# Patient Record
Sex: MALE | Race: WHITE | Employment: OTHER | URBAN - METROPOLITAN AREA
[De-identification: names, ages, dates, MRNs, and addresses within clinical notes are randomized per-mention and may not be internally consistent; named-entity substitution may affect disease eponyms.]

---

## 2018-01-03 PROBLEM — Z45.09 ENCOUNTER FOR ELECTRONIC ANALYSIS OF REVEAL EVENT RECORDER: Status: ACTIVE | Noted: 2018-01-03

## 2018-01-03 PROBLEM — I48.0 PAROXYSMAL ATRIAL FIBRILLATION (HCC): Status: ACTIVE | Noted: 2018-01-03

## 2018-01-03 RX ORDER — ERGOCALCIFEROL (VITAMIN D2) 1250 MCG
50000 CAPSULE ORAL DAILY
COMMUNITY
Start: 2017-09-05

## 2018-01-03 RX ORDER — LISINOPRIL 5 MG/1
5 TABLET ORAL 2 TIMES DAILY
COMMUNITY
Start: 2017-08-16 | End: 2019-10-30 | Stop reason: SDUPTHER

## 2018-01-03 RX ORDER — OXYBUTYNIN CHLORIDE 5 MG/1
5 TABLET ORAL 3 TIMES DAILY
COMMUNITY
End: 2019-10-30

## 2018-01-03 RX ORDER — TROSPIUM CHLORIDE 20 MG/1
20 TABLET, FILM COATED ORAL DAILY
COMMUNITY
Start: 2017-10-17 | End: 2020-07-29

## 2018-01-03 RX ORDER — MULTIVIT-MINS NO.63/IRON/FOLIC 27 MG-1 MG
1 TABLET ORAL DAILY
COMMUNITY
End: 2020-07-29

## 2018-01-03 RX ORDER — GABAPENTIN 100 MG/1
100 CAPSULE ORAL 3 TIMES DAILY
Status: ON HOLD | COMMUNITY
Start: 2017-12-11 | End: 2019-03-01 | Stop reason: ALTCHOICE

## 2018-01-03 RX ORDER — WARFARIN SODIUM 5 MG/1
TABLET ORAL
COMMUNITY
Start: 2017-08-25 | End: 2019-10-30 | Stop reason: SDUPTHER

## 2018-01-04 ENCOUNTER — OFFICE VISIT (OUTPATIENT)
Dept: CARDIOLOGY CLINIC | Age: 68
End: 2018-01-04

## 2018-01-04 ENCOUNTER — PROCEDURE VISIT (OUTPATIENT)
Dept: CARDIOLOGY CLINIC | Age: 68
End: 2018-01-04

## 2018-01-04 VITALS
BODY MASS INDEX: 31.15 KG/M2 | WEIGHT: 230 LBS | HEIGHT: 72 IN | DIASTOLIC BLOOD PRESSURE: 70 MMHG | HEART RATE: 60 BPM | SYSTOLIC BLOOD PRESSURE: 116 MMHG | OXYGEN SATURATION: 97 %

## 2018-01-04 DIAGNOSIS — I42.8 OTHER CARDIOMYOPATHY (HCC): ICD-10-CM

## 2018-01-04 DIAGNOSIS — Z45.09 ENCOUNTER FOR ELECTRONIC ANALYSIS OF REVEAL EVENT RECORDER: Primary | ICD-10-CM

## 2018-01-04 DIAGNOSIS — I48.0 PAROXYSMAL ATRIAL FIBRILLATION (HCC): ICD-10-CM

## 2018-01-04 DIAGNOSIS — Z95.818 STATUS POST PLACEMENT OF IMPLANTABLE LOOP RECORDER: ICD-10-CM

## 2018-01-04 DIAGNOSIS — R00.1 BRADYCARDIA: ICD-10-CM

## 2018-01-04 DIAGNOSIS — R00.2 PALPITATIONS: ICD-10-CM

## 2018-01-04 DIAGNOSIS — I48.0 PAROXYSMAL ATRIAL FIBRILLATION (HCC): Primary | ICD-10-CM

## 2018-01-04 DIAGNOSIS — R53.83 FATIGUE, UNSPECIFIED TYPE: ICD-10-CM

## 2018-01-04 PROCEDURE — 1036F TOBACCO NON-USER: CPT | Performed by: INTERNAL MEDICINE

## 2018-01-04 PROCEDURE — G8417 CALC BMI ABV UP PARAM F/U: HCPCS | Performed by: INTERNAL MEDICINE

## 2018-01-04 PROCEDURE — 4040F PNEUMOC VAC/ADMIN/RCVD: CPT | Performed by: INTERNAL MEDICINE

## 2018-01-04 PROCEDURE — 3017F COLORECTAL CA SCREEN DOC REV: CPT | Performed by: INTERNAL MEDICINE

## 2018-01-04 PROCEDURE — 93291 INTERROG DEV EVAL SCRMS IP: CPT | Performed by: INTERNAL MEDICINE

## 2018-01-04 PROCEDURE — 99214 OFFICE O/P EST MOD 30 MIN: CPT | Performed by: INTERNAL MEDICINE

## 2018-01-04 PROCEDURE — 1123F ACP DISCUSS/DSCN MKR DOCD: CPT | Performed by: INTERNAL MEDICINE

## 2018-01-04 PROCEDURE — G8484 FLU IMMUNIZE NO ADMIN: HCPCS | Performed by: INTERNAL MEDICINE

## 2018-01-04 PROCEDURE — G8427 DOCREV CUR MEDS BY ELIG CLIN: HCPCS | Performed by: INTERNAL MEDICINE

## 2018-01-04 NOTE — PATIENT INSTRUCTIONS
Patient Education        Atrial Fibrillation: Care Instructions  Your Care Instructions    Atrial fibrillation is an irregular and often fast heartbeat. Treating this condition is important for several reasons. It can cause blood clots, which can travel from your heart to your brain and cause a stroke. If you have a fast heartbeat, you may feel lightheaded, dizzy, and weak. An irregular heartbeat can also increase your risk for heart failure. Atrial fibrillation is often the result of another heart condition, such as high blood pressure or coronary artery disease. Making changes to improve your heart condition will help you stay healthy and active. Follow-up care is a key part of your treatment and safety. Be sure to make and go to all appointments, and call your doctor if you are having problems. It's also a good idea to know your test results and keep a list of the medicines you take. How can you care for yourself at home? Medicines  ? · Take your medicines exactly as prescribed. Call your doctor if you think you are having a problem with your medicine. You will get more details on the specific medicines your doctor prescribes. ? · If your doctor has given you a blood thinner to prevent a stroke, be sure you get instructions about how to take your medicine safely. Blood thinners can cause serious bleeding problems. ? · Do not take any vitamins, over-the-counter drugs, or herbal products without talking to your doctor first.   ? Lifestyle changes  ? · Do not smoke. Smoking can increase your chance of a stroke and heart attack. If you need help quitting, talk to your doctor about stop-smoking programs and medicines. These can increase your chances of quitting for good. ? · Eat a heart-healthy diet. ? · Stay at a healthy weight. Lose weight if you need to.   ? · Limit alcohol to 2 drinks a day for men and 1 drink a day for women. Too much alcohol can cause health problems. ? · Avoid colds and flu.  Get a

## 2018-01-04 NOTE — PROGRESS NOTES
treatment for cardiomyopathy. It was thought to be due to atrial fibrillation and rapid ventricular response although primary cardiomyopathy could not be ruled out. He is on good management including ACE-I and BB. His echo in March shows normal LVEF. He underwent a LHC by Dr. Michelet Muller that shows normal coronary arteries and LVEF by LV Gram estimates EF at 45-50%. He is no longer on antiarrhythmis (sotalol). He is on coumadin for stroke prevention. While he is active - he walks several miles at a time, and was able to hike while in [place] without symptoms, he has not felt like he had the energy to return to running - which is what he would really like to do. He denies chest pain, shortness of breath, edema or orthopnea. He complains of fatigue. Past Medical History:   Diagnosis Date    [facility]     [facility]     CHF (congestive heart failure) (HCC)     Idiopathic transverse myelitis (HCC)         Past Surgical History:   Procedure Laterality Date    COLONOSCOPY      HERNIA REPAIR      UPPER GASTROINTESTINAL ENDOSCOPY      VASECTOMY         Allergies:  No Known Allergies    Social History:   reports that he has never smoked. He has never used smokeless tobacco. He reports that he drinks alcohol. He reports that he does not use drugs. Family History:  family history includes Prostate Cancer in his father. Reviewed. Denies family history of sudden cardiac death, arrhythmia, premature CAD    Review of System:  All other systems reviewed and are negative except for that noted above.  Pertinent negatives are:   General: negative for fever, chills   Ophthalmic ROS: negative for - eye pain or loss of vision  ENT ROS: negative for - headaches, sore throat   Respiratory: negative for - cough, sputum  Cardiovascular: Reviewed in HPI  Gastrointestinal: negative for - abdominal pain, diarrhea, N/V  Hematology: negative for - bleeding, blood clots, bruising or jaundice  Genito-Urinary:  negative for - Dysuria or incontinence  Musculoskeletal: negative for - Joint swelling, muscle pain  Neurological: negative for - confusion, dizziness, headaches   Psychiatric: No anxiety, no depression. Dermatological: negative for - rash    Physical Examination:  Vitals:    18 1523   BP: 116/70   Pulse: 60   SpO2: 97%      Wt Readings from Last 3 Encounters:   18 230 lb (104.3 kg)   14 224 lb (101.6 kg)       · Constitutional: Oriented. No distress. · Head: Normocephalic and atraumatic. · Mouth/Throat: Oropharynx is clear and moist.   · Eyes: Conjunctivae normal. EOM are normal.   · Neck: Neck supple. No rigidity. No JVD present. · Cardiovascular: Normal rate, regular rhythm, S1&S2. · Pulmonary/Chest: Bilateral respiratory sounds. No wheezes, No rhonchi. · Abdominal: Soft. Bowel sounds present. No distension, No tenderness. · Musculoskeletal: No tenderness. No edema    · Lymphadenopathy: Has no cervical adenopathy. · Neurological: Alert and oriented. Cranial nerve appears intact, No Gross deficit   · Skin: Skin is warm and dry. No rash noted. · Psychiatric: Has a normal behavior     Labs, diagnostic and imaging results reviewed. INR 2.2 (2017)  Cr 0.92 (2017)   (2017)  ALT 28 (2017)  AST 30 (2017)  Reviewed. EC2018   SB 58bpm Qtc 415    ECHO 2017  - Left ventricle: The cavity size was normal. Wall thickness was      normal. Systolic function was normal. The estimated ejection      fraction was in the range of 55% to 60%. Wall motion was normal;      there were no regional wall motion abnormalities. The study is not      technically sufficient to allow evaluation of LV diastolic      function.    - Mitral valve: Mild regurgitation.    - Right ventricle: Systolic function was normal by objective      interpretation.    - Pulmonic valve: Peak gradient (S): 4mm Hg.      Echo 13  Study Conclusions    -

## 2018-02-05 PROCEDURE — 93299 PR REM INTERROG ICPMS/SCRMS <30 D TECH REVIEW: CPT | Performed by: INTERNAL MEDICINE

## 2018-02-05 PROCEDURE — 93298 REM INTERROG DEV EVAL SCRMS: CPT | Performed by: INTERNAL MEDICINE

## 2018-02-06 ENCOUNTER — NURSE ONLY (OUTPATIENT)
Dept: CARDIOLOGY CLINIC | Age: 68
End: 2018-02-06

## 2018-02-06 DIAGNOSIS — I48.0 PAROXYSMAL ATRIAL FIBRILLATION (HCC): ICD-10-CM

## 2018-02-06 DIAGNOSIS — Z45.09 ENCOUNTER FOR ELECTRONIC ANALYSIS OF REVEAL EVENT RECORDER: ICD-10-CM

## 2018-02-08 NOTE — PROGRESS NOTES
Carelink/loop recorder transmission implanted for atrial fib management. Atrial fib noted. Pt is on coumadin.

## 2018-05-13 PROCEDURE — 93298 REM INTERROG DEV EVAL SCRMS: CPT | Performed by: INTERNAL MEDICINE

## 2018-05-13 PROCEDURE — 93299 PR REM INTERROG ICPMS/SCRMS <30 D TECH REVIEW: CPT | Performed by: INTERNAL MEDICINE

## 2018-05-15 ENCOUNTER — NURSE ONLY (OUTPATIENT)
Dept: CARDIOLOGY CLINIC | Age: 68
End: 2018-05-15

## 2018-05-15 DIAGNOSIS — I48.0 PAROXYSMAL ATRIAL FIBRILLATION (HCC): ICD-10-CM

## 2018-05-15 DIAGNOSIS — Z45.09 ENCOUNTER FOR ELECTRONIC ANALYSIS OF REVEAL EVENT RECORDER: ICD-10-CM

## 2018-08-21 ENCOUNTER — NURSE ONLY (OUTPATIENT)
Dept: CARDIOLOGY CLINIC | Age: 68
End: 2018-08-21

## 2018-08-21 DIAGNOSIS — Z45.09 ENCOUNTER FOR ELECTRONIC ANALYSIS OF REVEAL EVENT RECORDER: ICD-10-CM

## 2018-08-21 DIAGNOSIS — I48.0 PAROXYSMAL ATRIAL FIBRILLATION (HCC): ICD-10-CM

## 2018-08-21 PROCEDURE — 93299 PR REM INTERROG ICPMS/SCRMS <30 D TECH REVIEW: CPT | Performed by: INTERNAL MEDICINE

## 2018-08-21 PROCEDURE — 93298 REM INTERROG DEV EVAL SCRMS: CPT | Performed by: INTERNAL MEDICINE

## 2018-09-26 NOTE — PROGRESS NOTES
[facility]   Electrophysiology Follow up   Date: 9/27/2018  Reason for Consultation: Afib  Consult Requesting Physician: THE [facility]      Chief Complaint   Patient presents with    Atrial Fibrillation     routine follow up        HPI: Nupur Mccormick is a 79 y.o. male being seen in follow up for maintenance evaluation for atrial fibrillation. Recent hospitalization 12/2017 at [facility] with idiopathic transverse myelitis. He follows up with Dr. Anette Nixon for this. Other than complains of weakness and feeling of burning/tingling sensation in hands and feet he feels otherwise well. Has has lost 46 pounds doing weight watchers since last visit. Recent diagnosis with MS, follows with Dr. Anette Nixon. Patient denies lightheadedness, dizziness, chest pain, palpitations, orthopnea, edema, presyncope or syncope. Cardiac wise doing fine      Interval History:  Past Note:\" Patient notes he is doing well at this time. He feels more fatigued than in the past. He notes he is currently scheduled for a hernia repair surgery. Also complains of ED    Past Note:\"1/2016 Has had some episodes of skipping beats and feeling run down. No clear atrial fibrillation . Otherwise ok    Past Note:\" 7/2015 Last Wednesday felt clumsy and his pulse was irregular and it resolved in 5-6 hours. He does not think he had atrial fibrillation. He was under some stress then. Feels fine now. Past Note:\" 4/2015 Feels ok. Gets more tired than his peers doing same activity but still has acceptable level of exercise tolerance. Past Note:\" 4/2014 he felt fine on his trip abroad. He generally feels more tired than before. Has not felt atrial fibrillation. Past Note:\" 10/2013 He had onset of irregular heart beats in 2010, but atrial fib was not diagnosed until later. He underwent cardioversion twice - January 15, 2013 and January 30, 2013. He had an ablation of atrial fibrillation with cryo balloon in March of 2012.      He has been doing better since the ablation, but he still complains of fatigue and inability to run for exercise. He has been a long time exerciser. Cari Ratliff He was started on treatment for cardiomyopathy. It was thought to be due to atrial fibrillation and rapid ventricular response although primary cardiomyopathy could not be ruled out. He is on good management including ACE-I and BB. His echo in March shows normal LVEF. He underwent a LHC by Dr. Makayla Bajwa that shows normal coronary arteries and LVEF by LV Gram estimates EF at 45-50%. He is no longer on antiarrhythmis (sotalol). He is on coumadin for stroke prevention. While he is active - he walks several miles at a time, and was able to hike while in [place] without symptoms, he has not felt like he had the energy to return to running - which is what he would really like to do. He denies chest pain, shortness of breath, edema or orthopnea. He complains of fatigue. Past Medical History:   Diagnosis Date    [facility] ([facility]     Cancer ([facility]     CHF (congestive heart failure) (HCC)     Idiopathic transverse myelitis (HCC)         Past Surgical History:   Procedure Laterality Date    COLONOSCOPY      HERNIA REPAIR      UPPER GASTROINTESTINAL ENDOSCOPY      VASECTOMY         Allergies:  No Known Allergies    Social History:   reports that he has never smoked. He has never used smokeless tobacco. He reports that he drinks alcohol. He reports that he does not use drugs. Family History:  family history includes Prostate Cancer in his father. Reviewed. Denies family history of sudden cardiac death, arrhythmia, premature CAD    Review of System:  All other systems reviewed and are negative except for that noted above.  Pertinent negatives are:   General: negative for fever, chills   Ophthalmic ROS: negative for - eye pain or loss of vision  ENT ROS: negative for - headaches, sore throat   Respiratory: negative for - cough, sputum  Cardiovascular: Systolic function was normal by objective interpretation.    - Pulmonic valve: Peak gradient (S): 4mm Hg. Echo 1/11/13  - Study data: No prior study was available for comparison. - Left ventricle: The cavity size was mildly dilated. Wall thickness  was normal. Systolic function was at the lower limits of normal.  The estimated ejection fraction was 50%. Wall motion was normal;  there were no regional wall motion abnormalities. The study is not  technically sufficient to allow evaluation of LV diastolic  function.  - Mitral valve: Mild regurgitation.  - Left atrium: The atrium was mildly dilated. - Right ventricle: Systolic function was normal by visual  assessment.  - Right atrium: The atrium was mildly dilated. MRI 2/12/13  IMPRESSION:  1. The left ventricle is mildly dilated with moderate to severely reduced   systolic function. There is global mild to moderate hypokinesis with   severe hypokinesis involving the basal septal and inferior, mid septal and   inferior and apical inferior   segments. There is no evidence of myocardial edema by T2 weighted   imaging. There is no DGE. LV function consistent with significant cardiomyopathy, may be secondary   to tachycardia (atrial fibrillation). However, can not rule out ischemic   cardiomyopathy given regional hypokinesis. Recommend ischemic evaluation. 2. The left atrium is mildly dilated. There are two right and two left   pulmonary veins without evidence of aneurysm or stenosis. The left   inferior pulmonary vein has an early branch. The left atrial appendage is   not adequately opacified to rule out   the presence of thrombus. There is moderate mitral regurgitation. 3. The right ventricle is normal in size with moderately reduced systolic   function. There is regional hypokinesis involving the basal inferior RV. 4. The right atrium is moderately dilated. There is mild to moderate   tricuspid regurgitation.    5. The aorta is at the upper limits of normal in size. 6. The pulmonary artery is normal in size. The coronary sinus is normal   in size. 7. There are small bilateral pleural effusions. Atelectasis is noted in   the posterior lung fields. 8. There is degenerative disc disease involving the thoracic vertebrae. 9. There is a 5 mm nodule of increased signal intensity visualized in the   liver. Medication:  Current Outpatient Prescriptions   Medication Sig Dispense Refill    Mirabegron ER 25 MG TB24 Take 25 mg by mouth daily      ergocalciferol (ERGOCALCIFEROL) 80642 units capsule Take 50,000 Units by mouth daily      lisinopril (PRINIVIL;ZESTRIL) 5 MG tablet Take 5 mg by mouth 2 times daily      warfarin (COUMADIN) 5 MG tablet TAKE UP TO 1 AND 1/2 TABLETS DAILY OR AS PRESCRIBED BY THE [facility]      tamsulosin (FLOMAX) 0.4 MG capsule Take 0.4 mg by mouth daily.  warfarin (COUMADIN) 5 MG tablet Take 5 mg by mouth daily. Takes Tues. Thursday,Saturday and sunday      warfarin (COUMADIN) 7.5 MG tablet Take 7.5 mg by mouth. Monday Wednesday and Friday      Multiple Vitamins-Minerals (THERAPEUTIC MULTIVITAMIN-MINERALS) tablet Take 2 tablets by mouth daily.  gabapentin (NEURONTIN) 100 MG capsule Take 100 mg by mouth 3 times daily.  Prenatal Vit-Fe Fumarate-FA (M-VIT) TABS Take 1 tablet by mouth daily      trospium (SANCTURA) 20 MG tablet Take 20 mg by mouth daily      oxybutynin (DITROPAN) 5 MG tablet Take 5 mg by mouth 3 times daily      lisinopril (PRINIVIL;ZESTRIL) 5 MG tablet Take 5 mg by mouth daily.  oxybutynin (DITROPAN) 5 MG/5ML syrup Take 7.5 mg by mouth 2 times daily. 7.5ml at night 2.5ml in the  morning      NONFORMULARY Fish Oil 1000mgm daily       No current facility-administered medications for this visit. Assessment and plan:     Atrial fibrillation: s/p afib ablation. No symptoms of recurrence off of antiarrhythmics. Patient is currently on coumadin for stroke prevention.  No Afib on Implantable Loop recorder     Cardiomyopathy: Non-ischemic. Last EF measured by LV gram with [facility] (different modality) but shows improvement to 55%  Followed by Dr. Flynn Posada. Currently on ACE-I,BB, and coumadin No clinical s/s of heart failure. Bradycardia: Will continue to monitor. No issues so far    Anticoagulation: AFib - Chads 2 score of 1 for chf. Fatigue: improved    shortness of breath : F/u with HFC    Palpitations: mild . No atrial fibrillation, few PACs and PVCs    implantable loop monitor (ILR): The CIED was interrogated and programmed and I supervised and reviewed all the data. All findings and changes are in device interrogation sheat and reflect my personal interpretation and changes and is scanned to Epic. Discussed option to remove and replace ILR- placed 2/2016    No atrial fibrillation . 0 nanda or pause episodes. No Afib, all are PAC or PVC's. 1 symptoms episode, 3/14/17, SR with PVC's. Rate 60-80 bpms. Plan:  Continue remote device checks - ILR  Recommend Dr. Jay Thompson to establish care for MS  Follow up with EP 7 months (April 2019) ILR battery check for PATO      I independently reviewed device check interrogation     Thank you for allowing me to participate in the care of Gato Longoria. Further evaluation will be based upon the patient's clinical course and testing results. All questions and concerns were addressed to the patient/family. Alternatives to my treatment were discussed. I have discussed the above stated plan and the patient verbalized understanding and agreed with the plan. NOTE: This report was transcribed using voice recognition software. Every effort was made to ensure accuracy, however, inadvertent computerized transcription errors may be present.        Maureen Wall MD, Fer[facility] Beltran [address] [facility]   Office: [phone]

## 2018-09-27 ENCOUNTER — PROCEDURE VISIT (OUTPATIENT)
Dept: CARDIOLOGY CLINIC | Age: 68
End: 2018-09-27
Payer: MEDICARE

## 2018-09-27 ENCOUNTER — OFFICE VISIT (OUTPATIENT)
Dept: CARDIOLOGY CLINIC | Age: 68
End: 2018-09-27
Payer: MEDICARE

## 2018-09-27 VITALS
DIASTOLIC BLOOD PRESSURE: 71 MMHG | HEART RATE: 59 BPM | WEIGHT: 192 LBS | SYSTOLIC BLOOD PRESSURE: 111 MMHG | BODY MASS INDEX: 26.01 KG/M2 | HEIGHT: 72 IN

## 2018-09-27 DIAGNOSIS — I48.0 PAROXYSMAL ATRIAL FIBRILLATION (HCC): Primary | ICD-10-CM

## 2018-09-27 DIAGNOSIS — Z45.09 ENCOUNTER FOR ELECTRONIC ANALYSIS OF REVEAL EVENT RECORDER: ICD-10-CM

## 2018-09-27 DIAGNOSIS — I48.0 PAROXYSMAL ATRIAL FIBRILLATION (HCC): ICD-10-CM

## 2018-09-27 DIAGNOSIS — R06.02 SOB (SHORTNESS OF BREATH): ICD-10-CM

## 2018-09-27 DIAGNOSIS — R00.2 PALPITATION: ICD-10-CM

## 2018-09-27 DIAGNOSIS — I42.0 DILATED CARDIOMYOPATHY (HCC): ICD-10-CM

## 2018-09-27 DIAGNOSIS — R00.1 BRADYCARDIA: ICD-10-CM

## 2018-09-27 PROCEDURE — 3017F COLORECTAL CA SCREEN DOC REV: CPT | Performed by: INTERNAL MEDICINE

## 2018-09-27 PROCEDURE — G8427 DOCREV CUR MEDS BY ELIG CLIN: HCPCS | Performed by: INTERNAL MEDICINE

## 2018-09-27 PROCEDURE — 99214 OFFICE O/P EST MOD 30 MIN: CPT | Performed by: INTERNAL MEDICINE

## 2018-09-27 PROCEDURE — 93291 INTERROG DEV EVAL SCRMS IP: CPT | Performed by: INTERNAL MEDICINE

## 2018-09-27 PROCEDURE — G8417 CALC BMI ABV UP PARAM F/U: HCPCS | Performed by: INTERNAL MEDICINE

## 2018-09-27 PROCEDURE — 4040F PNEUMOC VAC/ADMIN/RCVD: CPT | Performed by: INTERNAL MEDICINE

## 2018-09-27 PROCEDURE — 1101F PT FALLS ASSESS-DOCD LE1/YR: CPT | Performed by: INTERNAL MEDICINE

## 2018-09-27 PROCEDURE — 1123F ACP DISCUSS/DSCN MKR DOCD: CPT | Performed by: INTERNAL MEDICINE

## 2018-09-27 PROCEDURE — 1036F TOBACCO NON-USER: CPT | Performed by: INTERNAL MEDICINE

## 2018-09-27 NOTE — PROGRESS NOTES
Patient comes in for interrogation of his implanted loop recorder. Interrogation shows sinus with ectopy. Pt will see Dr. Abhijit Bartlett today.

## 2018-11-20 ENCOUNTER — TELEPHONE (OUTPATIENT)
Dept: CARDIOLOGY CLINIC | Age: 68
End: 2018-11-20

## 2019-01-07 PROCEDURE — 93299 PR REM INTERROG ICPMS/SCRMS <30 D TECH REVIEW: CPT | Performed by: INTERNAL MEDICINE

## 2019-01-07 PROCEDURE — 93298 REM INTERROG DEV EVAL SCRMS: CPT | Performed by: INTERNAL MEDICINE

## 2019-01-08 ENCOUNTER — NURSE ONLY (OUTPATIENT)
Dept: CARDIOLOGY CLINIC | Age: 69
End: 2019-01-08
Payer: MEDICARE

## 2019-01-08 DIAGNOSIS — I48.0 PAROXYSMAL ATRIAL FIBRILLATION (HCC): ICD-10-CM

## 2019-01-08 DIAGNOSIS — Z45.09 ENCOUNTER FOR ELECTRONIC ANALYSIS OF REVEAL EVENT RECORDER: ICD-10-CM

## 2019-02-06 ENCOUNTER — PROCEDURE VISIT (OUTPATIENT)
Dept: CARDIOLOGY CLINIC | Age: 69
End: 2019-02-06
Payer: MEDICARE

## 2019-02-06 ENCOUNTER — OFFICE VISIT (OUTPATIENT)
Dept: CARDIOLOGY CLINIC | Age: 69
End: 2019-02-06
Payer: MEDICARE

## 2019-02-06 VITALS
HEART RATE: 62 BPM | BODY MASS INDEX: 27.5 KG/M2 | WEIGHT: 203 LBS | HEIGHT: 72 IN | DIASTOLIC BLOOD PRESSURE: 67 MMHG | SYSTOLIC BLOOD PRESSURE: 104 MMHG

## 2019-02-06 DIAGNOSIS — Z45.09 ENCOUNTER FOR ELECTRONIC ANALYSIS OF REVEAL EVENT RECORDER: ICD-10-CM

## 2019-02-06 DIAGNOSIS — I48.0 PAROXYSMAL ATRIAL FIBRILLATION (HCC): ICD-10-CM

## 2019-02-06 DIAGNOSIS — R00.2 PALPITATION: ICD-10-CM

## 2019-02-06 DIAGNOSIS — I48.0 PAROXYSMAL ATRIAL FIBRILLATION (HCC): Primary | ICD-10-CM

## 2019-02-06 DIAGNOSIS — R53.83 OTHER FATIGUE: ICD-10-CM

## 2019-02-06 DIAGNOSIS — R06.02 SOB (SHORTNESS OF BREATH): ICD-10-CM

## 2019-02-06 DIAGNOSIS — I42.0 DILATED CARDIOMYOPATHY (HCC): ICD-10-CM

## 2019-02-06 PROCEDURE — 93291 INTERROG DEV EVAL SCRMS IP: CPT | Performed by: INTERNAL MEDICINE

## 2019-02-06 PROCEDURE — 1101F PT FALLS ASSESS-DOCD LE1/YR: CPT | Performed by: INTERNAL MEDICINE

## 2019-02-06 PROCEDURE — 99214 OFFICE O/P EST MOD 30 MIN: CPT | Performed by: INTERNAL MEDICINE

## 2019-02-06 PROCEDURE — 1036F TOBACCO NON-USER: CPT | Performed by: INTERNAL MEDICINE

## 2019-02-06 PROCEDURE — G8484 FLU IMMUNIZE NO ADMIN: HCPCS | Performed by: INTERNAL MEDICINE

## 2019-02-06 PROCEDURE — 4040F PNEUMOC VAC/ADMIN/RCVD: CPT | Performed by: INTERNAL MEDICINE

## 2019-02-06 PROCEDURE — 93000 ELECTROCARDIOGRAM COMPLETE: CPT | Performed by: INTERNAL MEDICINE

## 2019-02-06 PROCEDURE — 1123F ACP DISCUSS/DSCN MKR DOCD: CPT | Performed by: INTERNAL MEDICINE

## 2019-02-06 PROCEDURE — G8427 DOCREV CUR MEDS BY ELIG CLIN: HCPCS | Performed by: INTERNAL MEDICINE

## 2019-02-06 PROCEDURE — G8417 CALC BMI ABV UP PARAM F/U: HCPCS | Performed by: INTERNAL MEDICINE

## 2019-02-06 PROCEDURE — 3017F COLORECTAL CA SCREEN DOC REV: CPT | Performed by: INTERNAL MEDICINE

## 2019-03-01 ENCOUNTER — HOSPITAL ENCOUNTER (OUTPATIENT)
Dept: CARDIAC CATH/INVASIVE PROCEDURES | Age: 69
Discharge: HOME | End: 2019-03-01
Attending: INTERNAL MEDICINE | Admitting: INTERNAL MEDICINE
Payer: MEDICARE

## 2019-03-01 VITALS — BODY MASS INDEX: 26.55 KG/M2 | WEIGHT: 196 LBS | HEIGHT: 72 IN

## 2019-03-01 PROCEDURE — 33286 RMVL SUBQ CAR RHYTHM MNTR: CPT | Performed by: INTERNAL MEDICINE

## 2019-03-01 PROCEDURE — 33285 INSJ SUBQ CAR RHYTHM MNTR: CPT

## 2019-03-01 PROCEDURE — 33285 INSJ SUBQ CAR RHYTHM MNTR: CPT | Performed by: INTERNAL MEDICINE

## 2019-03-01 PROCEDURE — 6360000002 HC RX W HCPCS

## 2019-03-01 PROCEDURE — 2500000003 HC RX 250 WO HCPCS

## 2019-03-01 PROCEDURE — 99152 MOD SED SAME PHYS/QHP 5/>YRS: CPT

## 2019-03-01 PROCEDURE — C1764 EVENT RECORDER, CARDIAC: HCPCS

## 2019-03-01 PROCEDURE — 33286 RMVL SUBQ CAR RHYTHM MNTR: CPT

## 2019-03-07 ENCOUNTER — PROCEDURE VISIT (OUTPATIENT)
Dept: CARDIOLOGY CLINIC | Age: 69
End: 2019-03-07
Payer: MEDICARE

## 2019-03-07 DIAGNOSIS — Z45.09 ENCOUNTER FOR ELECTRONIC ANALYSIS OF REVEAL EVENT RECORDER: ICD-10-CM

## 2019-03-07 DIAGNOSIS — I48.0 PAF (PAROXYSMAL ATRIAL FIBRILLATION) (HCC): ICD-10-CM

## 2019-03-07 PROCEDURE — 93291 INTERROG DEV EVAL SCRMS IP: CPT | Performed by: INTERNAL MEDICINE

## 2019-04-03 ENCOUNTER — TELEPHONE (OUTPATIENT)
Dept: CARDIOLOGY CLINIC | Age: 69
End: 2019-04-03

## 2019-04-03 NOTE — TELEPHONE ENCOUNTER
Last OV 2/6/19:    Assessment and plan:     Atrial fibrillation: s/p afib ablation. No symptoms of recurrence off of antiarrhythmics. Patient is currently on coumadin for stroke prevention. No Atrial fibrillation   7.2 % Afib all of which is sinus with PAC ,on Implantable Loop recorder which is at EOS    Cardiomyopathy: Non-ischemic. Last EF measured by LV gram with LHC (different modality) but shows improvement to 55%   No clinical s/s of heart failure. Followed at     Bradycardia: Will continue to monitor. No issues so far    Anticoagulation: AFib - Chads 2 score of 1 for chf. Fatigue: improved     shortness of breath : F/u with HFC    Palpitations: mild . No atrial fibrillation, PACs and few PVCs    implantable loop monitor (ILR): The CIED was interrogated and programmed and I supervised and reviewed all the data. All findings and changes are in device interrogation sheat and reflect my personal interpretation and changes and is scanned to Epic.    Given Hx of Atrial fibrillation and cardiomyopathy, needs continuous monitoring  EOS will remove and replace

## 2019-04-03 NOTE — LETTER
[address] [address] [place] [address]  Phone: [phone]  Fax: [phone]    Anton Montenegro MD        April 5, 2019     Patient: Shaniqua Corley   YOB: 1950   Date of Visit: 4/3/2019       To Whom It May Concern: It is my medical opinion that Gina Szymanski ok for surgery. He can stop warfarin five days before procedure. No need for Lovenox bridging. He is stable for this procedure. There are no apparent cardiac contraindications to the proposed procedure using standard anesthetic technique. There are no apparent interventions to ameliorate the cardiac risk. This clinical assessment assumes a full Anesthesia evaluation for overall risk of airway management, type and route of anesthetic, and other relevant anesthesia-specific considerations. If you have any questions or concerns, please don't hesitate to call.     Sincerely,        Anton Montenegro MD

## 2019-04-03 NOTE — TELEPHONE ENCOUNTER
CARDIAC CLEARANCE     What type of procedure are you having? Knee under general     Which physician is performing your procedure?   karisherosa maria     When is your procedure scheduled for? 4/9/19    Where are you having this procedure? [facility]     Are you taking Blood Thinners? Coumadin    If so what? (Name/dose/frequesncy)     Does the surgeon want you to stop your blood thinner? If so for how long? Needs to know how long     Phone Number and Contact Name for Physicians office:    Fax number to send information:      Got loop 3/1/19 mxa     Needs EKG  , can his pcp do it or should he come here ?     PLEASE CALL PATIENT TODAY TO LET HIM KNOW WHAT HE NEEDS TO DO

## 2019-04-05 NOTE — TELEPHONE ENCOUNTER
Received a call from Niels espinoza at [facility]. Pt was unsure of the clearance. There were instructions on how to hold the coumadin , but no verbiage directly about if pt is cleared. Please just say Pascual Coughlin will reword the letter for clearance.

## 2019-04-05 NOTE — TELEPHONE ENCOUNTER
Placed MXA instructions in a letter in the pt's chart. Printed out and faxed to the number provided below.

## 2019-04-05 NOTE — TELEPHONE ENCOUNTER
Surgeons office really needs to know TODAY what to do with this patients coumadin , his surgery is Tuesday .

## 2019-04-05 NOTE — TELEPHONE ENCOUNTER
Informed patient of MXA instructions for warfarin . Patient asking that the instructions for the warfarin also be put on the clearance letter that is faxed to surgeon office .

## 2019-04-09 ENCOUNTER — NURSE ONLY (OUTPATIENT)
Dept: CARDIOLOGY CLINIC | Age: 69
End: 2019-04-09
Payer: MEDICARE

## 2019-04-09 DIAGNOSIS — Z45.09 ENCOUNTER FOR ELECTRONIC ANALYSIS OF REVEAL EVENT RECORDER: ICD-10-CM

## 2019-04-09 DIAGNOSIS — I48.0 PAF (PAROXYSMAL ATRIAL FIBRILLATION) (HCC): ICD-10-CM

## 2019-04-09 PROCEDURE — 93299 PR REM INTERROG ICPMS/SCRMS <30 D TECH REVIEW: CPT | Performed by: INTERNAL MEDICINE

## 2019-04-09 PROCEDURE — 93298 REM INTERROG DEV EVAL SCRMS: CPT | Performed by: INTERNAL MEDICINE

## 2019-04-09 NOTE — LETTER
[address] [facility] [phone]  [address] [facility],[address] Floor [phone]    Pacemaker/Defibrillator Clinic          04/08/19        [address] [place] [address]        Dear Srikanth Mejia    This letter is to inform you that we received the transmission from your monitor at home that checks your pacemaker and/or defibrillator, or implanted heart monitor. The next date your monitor will automatically transmit will be 6/11/19. Please do not send additional routine transmissions unless specifically requested. Your device and monitor are wireless and most transmit cellularly, but please periodically check your monitor is still plugged in to the electrical outlet. If you still use the telephone land line to send please ensure the connection to the phone lavell is secure. This will help to ensure successful automatic transmissions in the future. Also, the monitor needs to be close to you while sleeping at night. Please be aware that the remote device transmission sites are periodically monitored only during regular business hours during which simultaneous in-office device clinics are being run. If your transmission requires attention, we will contact you as soon as possible. Thank you.             Fito

## 2019-05-08 ENCOUNTER — TELEPHONE (OUTPATIENT)
Dept: CARDIOLOGY CLINIC | Age: 69
End: 2019-05-08

## 2019-05-08 NOTE — TELEPHONE ENCOUNTER
Another doctor (new pcp to him) ordered a GXT for him and he had it in early April 2019 at [facility] in [address] [place] [address]. Could this office call to get the results of the GXT for SHANTELL to review ? He trust SHANTELL and just wants to hear from him that he is ok for his knee surgery 5/16/19. Seems like all these doctors keep making him have all these test and now he is a little worried , but he totally trust SHANTELL . He knows shantell is oot until next week . He will call on 5/15 to see if shantell has seen the test result .

## 2019-06-11 ENCOUNTER — NURSE ONLY (OUTPATIENT)
Dept: CARDIOLOGY CLINIC | Age: 69
End: 2019-06-11
Payer: MEDICARE

## 2019-06-11 DIAGNOSIS — I48.0 PAF (PAROXYSMAL ATRIAL FIBRILLATION) (HCC): ICD-10-CM

## 2019-06-11 DIAGNOSIS — Z45.09 ENCOUNTER FOR ELECTRONIC ANALYSIS OF REVEAL EVENT RECORDER: ICD-10-CM

## 2019-06-11 PROCEDURE — 93299 PR REM INTERROG ICPMS/SCRMS <30 D TECH REVIEW: CPT | Performed by: INTERNAL MEDICINE

## 2019-06-11 PROCEDURE — 93298 REM INTERROG DEV EVAL SCRMS: CPT | Performed by: INTERNAL MEDICINE

## 2019-06-13 NOTE — PROGRESS NOTES
Carelink/loop recorder transmission NSR with ectopy not AF. Implanted for AF. See PACEART report under Cardiology tab.

## 2019-07-16 ENCOUNTER — NURSE ONLY (OUTPATIENT)
Dept: CARDIOLOGY CLINIC | Age: 69
End: 2019-07-16
Payer: MEDICARE

## 2019-07-16 DIAGNOSIS — I48.0 PAF (PAROXYSMAL ATRIAL FIBRILLATION) (HCC): ICD-10-CM

## 2019-07-16 DIAGNOSIS — Z45.09 ENCOUNTER FOR ELECTRONIC ANALYSIS OF REVEAL EVENT RECORDER: ICD-10-CM

## 2019-07-16 PROCEDURE — 93298 REM INTERROG DEV EVAL SCRMS: CPT | Performed by: INTERNAL MEDICINE

## 2019-07-16 PROCEDURE — 93299 PR REM INTERROG ICPMS/SCRMS <30 D TECH REVIEW: CPT | Performed by: INTERNAL MEDICINE

## 2019-08-27 ENCOUNTER — NURSE ONLY (OUTPATIENT)
Dept: CARDIOLOGY CLINIC | Age: 69
End: 2019-08-27
Payer: MEDICARE

## 2019-08-27 DIAGNOSIS — Z45.09 ENCOUNTER FOR ELECTRONIC ANALYSIS OF REVEAL EVENT RECORDER: ICD-10-CM

## 2019-08-27 DIAGNOSIS — I48.0 PAF (PAROXYSMAL ATRIAL FIBRILLATION) (HCC): ICD-10-CM

## 2019-08-27 PROCEDURE — 93299 PR REM INTERROG ICPMS/SCRMS <30 D TECH REVIEW: CPT | Performed by: INTERNAL MEDICINE

## 2019-08-27 PROCEDURE — 93298 REM INTERROG DEV EVAL SCRMS: CPT | Performed by: INTERNAL MEDICINE

## 2019-10-01 ENCOUNTER — NURSE ONLY (OUTPATIENT)
Dept: CARDIOLOGY CLINIC | Age: 69
End: 2019-10-01
Payer: MEDICARE

## 2019-10-01 DIAGNOSIS — Z45.09 ENCOUNTER FOR ELECTRONIC ANALYSIS OF REVEAL EVENT RECORDER: ICD-10-CM

## 2019-10-01 DIAGNOSIS — I48.0 PAF (PAROXYSMAL ATRIAL FIBRILLATION) (HCC): ICD-10-CM

## 2019-10-01 PROCEDURE — 93299 PR REM INTERROG ICPMS/SCRMS <30 D TECH REVIEW: CPT | Performed by: INTERNAL MEDICINE

## 2019-10-01 PROCEDURE — 93298 REM INTERROG DEV EVAL SCRMS: CPT | Performed by: INTERNAL MEDICINE

## 2019-10-30 ENCOUNTER — PROCEDURE VISIT (OUTPATIENT)
Dept: CARDIOLOGY CLINIC | Age: 69
End: 2019-10-30
Payer: MEDICARE

## 2019-10-30 ENCOUNTER — OFFICE VISIT (OUTPATIENT)
Dept: CARDIOLOGY CLINIC | Age: 69
End: 2019-10-30
Payer: MEDICARE

## 2019-10-30 VITALS
HEIGHT: 72 IN | OXYGEN SATURATION: 98 % | BODY MASS INDEX: 27.81 KG/M2 | DIASTOLIC BLOOD PRESSURE: 77 MMHG | HEART RATE: 49 BPM | WEIGHT: 205.3 LBS | SYSTOLIC BLOOD PRESSURE: 121 MMHG

## 2019-10-30 DIAGNOSIS — R53.82 CHRONIC FATIGUE: ICD-10-CM

## 2019-10-30 DIAGNOSIS — I48.0 PAF (PAROXYSMAL ATRIAL FIBRILLATION) (HCC): Primary | ICD-10-CM

## 2019-10-30 DIAGNOSIS — I48.0 PAF (PAROXYSMAL ATRIAL FIBRILLATION) (HCC): ICD-10-CM

## 2019-10-30 DIAGNOSIS — Z45.09 ENCOUNTER FOR ELECTRONIC ANALYSIS OF REVEAL EVENT RECORDER: ICD-10-CM

## 2019-10-30 PROCEDURE — 93291 INTERROG DEV EVAL SCRMS IP: CPT | Performed by: INTERNAL MEDICINE

## 2019-10-30 PROCEDURE — 93000 ELECTROCARDIOGRAM COMPLETE: CPT | Performed by: NURSE PRACTITIONER

## 2019-10-30 PROCEDURE — 99214 OFFICE O/P EST MOD 30 MIN: CPT | Performed by: NURSE PRACTITIONER

## 2019-11-18 ENCOUNTER — TELEPHONE (OUTPATIENT)
Dept: CARDIOLOGY CLINIC | Age: 69
End: 2019-11-18

## 2019-12-03 ENCOUNTER — NURSE ONLY (OUTPATIENT)
Dept: CARDIOLOGY CLINIC | Age: 69
End: 2019-12-03
Payer: MEDICARE

## 2019-12-03 DIAGNOSIS — Z45.09 ENCOUNTER FOR ELECTRONIC ANALYSIS OF REVEAL EVENT RECORDER: ICD-10-CM

## 2019-12-03 DIAGNOSIS — I48.0 PAF (PAROXYSMAL ATRIAL FIBRILLATION) (HCC): ICD-10-CM

## 2019-12-03 PROCEDURE — 93299 PR REM INTERROG ICPMS/SCRMS <30 D TECH REVIEW: CPT | Performed by: INTERNAL MEDICINE

## 2019-12-03 PROCEDURE — 93298 REM INTERROG DEV EVAL SCRMS: CPT | Performed by: INTERNAL MEDICINE

## 2020-01-02 ENCOUNTER — TELEPHONE (OUTPATIENT)
Dept: CARDIOLOGY CLINIC | Age: 70
End: 2020-01-02

## 2020-01-02 ENCOUNTER — NURSE ONLY (OUTPATIENT)
Dept: CARDIOLOGY CLINIC | Age: 70
End: 2020-01-02
Payer: MEDICARE

## 2020-01-02 NOTE — TELEPHONE ENCOUNTER
Medication Refill    Medication needing refilled: lisinopril , warfarin    Doseage of the medication:    How are you taking this medication (QD, BID, TID, QID, PRN):    30 or 90 day supply called in: 80    Which Pharmacy are we sending the medication to?:     [facility] ID [id number] group# [facility]     CHANGE IN CHART

## 2020-01-02 NOTE — PROGRESS NOTES
myeasydocs remote Linq report shows all AF recordings to be sinus with ectopy. No other arrhythmias to report. We will continue to follow remotely.

## 2020-01-02 NOTE — LETTER
[address] [facility] [phone]  [address] [facility],[address] [phone]    Pacemaker/Defibrillator Clinic          01/02/20        [address] [place] [address]        Dear Kaley Moser    This letter is to inform you that we received the transmission from your monitor at home that checks your implanted heart monitor. Your report shows no abnormal rhythms. The next date your monitor will automatically transmit will be 2-4-20. Your device and monitor are wireless and most transmit cellularly, but please periodically check your monitor is still plugged in to the electrical outlet. If you still use the telephone land line to send please ensure the connection to the phone lavell is secure. This will help to ensure successful automatic transmissions in the future. Also, the monitor needs to be close to you while sleeping at night. Please be aware that the remote device transmission sites are periodically monitored only during regular business hours during which simultaneous in-office device clinics are being run. If your transmission requires attention, we will contact you as soon as possible. Thank you.             Celia Bales

## 2020-01-03 PROCEDURE — 93298 REM INTERROG DEV EVAL SCRMS: CPT | Performed by: INTERNAL MEDICINE

## 2020-01-03 RX ORDER — LISINOPRIL 5 MG/1
5 TABLET ORAL DAILY
Qty: 90 TABLET | Refills: 3 | Status: SHIPPED | OUTPATIENT
Start: 2020-01-03 | End: 2020-12-17 | Stop reason: SDUPTHER

## 2020-02-04 ENCOUNTER — NURSE ONLY (OUTPATIENT)
Dept: CARDIOLOGY CLINIC | Age: 70
End: 2020-02-04

## 2020-03-08 PROCEDURE — 93298 REM INTERROG DEV EVAL SCRMS: CPT | Performed by: INTERNAL MEDICINE

## 2020-03-08 PROCEDURE — G2066 INTER DEVC REMOTE 30D: HCPCS | Performed by: INTERNAL MEDICINE

## 2020-03-09 ENCOUNTER — NURSE ONLY (OUTPATIENT)
Dept: CARDIOLOGY CLINIC | Age: 70
End: 2020-03-09
Payer: MEDICARE

## 2020-06-01 ENCOUNTER — NURSE ONLY (OUTPATIENT)
Dept: CARDIOLOGY CLINIC | Age: 70
End: 2020-06-01
Payer: MEDICARE

## 2020-06-01 PROCEDURE — G2066 INTER DEVC REMOTE 30D: HCPCS | Performed by: INTERNAL MEDICINE

## 2020-06-01 PROCEDURE — 93298 REM INTERROG DEV EVAL SCRMS: CPT | Performed by: INTERNAL MEDICINE

## 2020-06-01 NOTE — LETTER
[address] [facility] [phone] 8800 [facility],4Th Floor [phone]    Pacemaker/Defibrillator Clinic          05/31/20        [address] [place] [address]        Dear Rosario Awad    This letter is to inform you that we received the transmission from your monitor at home that checks your implanted heart device. The next date your monitor will automatically transmit will be 7-6-20. If your report needs attention we will notify you. Your device and monitor are wireless and most transmit cellularly, but please periodically check your monitor is still plugged in to the electrical outlet. If you still use the telephone land line to send please ensure the connection to the phone lavell is secure. This will help to ensure successful automatic transmissions in the future. Also, the monitor needs to be close to you while sleeping at night. Please be aware that the remote device transmission sites are periodically monitored only during regular business hours during which simultaneous in-office device clinics are being run. If your transmission requires attention, we will contact you as soon as possible. Thank you.             Celia [address]

## 2020-07-06 ENCOUNTER — NURSE ONLY (OUTPATIENT)
Dept: CARDIOLOGY CLINIC | Age: 70
End: 2020-07-06
Payer: MEDICARE

## 2020-07-06 PROCEDURE — 93298 REM INTERROG DEV EVAL SCRMS: CPT | Performed by: INTERNAL MEDICINE

## 2020-07-06 PROCEDURE — G2066 INTER DEVC REMOTE 30D: HCPCS | Performed by: INTERNAL MEDICINE

## 2020-07-06 NOTE — LETTER
[address] [facility] [phone] 8800 [facility],4Th Floor [phone]    Pacemaker/Defibrillator Clinic          07/06/20        [address] [place], [place] [address]        Dear Nicol Beyer    This letter is to inform you that we received the transmission from your monitor at home that checks your implanted heart device. The next date your monitor will automatically transmit will be 8-31-20. If your report needs attention we will notify you. Your device and monitor are wireless and most transmit cellularly, but please periodically check your monitor is still plugged in to the electrical outlet. If you still use the telephone land line to send please ensure the connection to the phone lavell is secure. This will help to ensure successful automatic transmissions in the future. Also, the monitor needs to be close to you while sleeping at night. Please be aware that the remote device transmission sites are periodically monitored only during regular business hours during which simultaneous in-office device clinics are being run. If your transmission requires attention, we will contact you as soon as possible. Thank you.             [facility], [place], operated by [facility]

## 2020-07-06 NOTE — PROGRESS NOTES
[facility] remote Linq report shows no arrhythmias. AF recordings are not real. These all shows NSR with ectopy. We will continue to monitor remotely.

## 2020-07-28 NOTE — PROGRESS NOTES
were no regional wall motion abnormalities. The study is not      technically sufficient to allow evaluation of LV diastolic function.    - Mitral valve: Mild regurgitation.    - Right ventricle: Systolic function was normal by objective interpretation.    - Pulmonic valve: Peak gradient (S): 4mm Hg. Echo 1/11/13  - Study data: No prior study was available for comparison. - Left ventricle: The cavity size was mildly dilated. Wall thickness  was normal. Systolic function was at the lower limits of normal.  The estimated ejection fraction was 50%. Wall motion was normal;  there were no regional wall motion abnormalities. The study is not  technically sufficient to allow evaluation of LV diastolic  function.  - Mitral valve: Mild regurgitation.  - Left atrium: The atrium was mildly dilated. - Right ventricle: Systolic function was normal by visual  assessment.  - Right atrium: The atrium was mildly dilated. Stress 4.2019    FINAL INTERPRETATION  There is probably normal myocardial perfusion. However, there is mild left ventricular dysfunction and extensive areas of attenuation as described below. Overall study quality is fair.  Scan significance indicates low to moderate cardiac risk. There is evidence of diaphragmatic attenuation and sub-diaphragmatic interfering activity. PERFUSION FINDINGS  There is a medium to large sized area of moderately decreased perfusion in the basal inferior, basal inferoseptal, basal inferolateral, mid inferior, and apical inferior walls at rest with improvement with stress most consistent with extensive artifact. There is no evidence of visual transient dilation with a normal stress/rest left ventricular volume ratio of 1.07. The sum stress score is 0 (normal: less than 4, mildly abnormal: 4-8, severely abnormal: greater than 8). The right ventricle is mildly dilated.     FUNCTION FINDINGS  The calculated left ventricular ejection fraction at rest is mildly reduced at 47% with an end diastolic volume of 899 mL and end systolic volume of 93 mL. MRI 2/12/13  IMPRESSION:  1. The left ventricle is mildly dilated with moderate to severely reduced   systolic function. There is global mild to moderate hypokinesis with   severe hypokinesis involving the basal septal and inferior, mid septal and   inferior and apical inferior   segments. There is no evidence of myocardial edema by T2 weighted   imaging. There is no DGE. LV function consistent with significant cardiomyopathy, may be secondary   to tachycardia (atrial fibrillation). However, can not rule out ischemic   cardiomyopathy given regional hypokinesis. Recommend ischemic evaluation. 2. The left atrium is mildly dilated. There are two right and two left   pulmonary veins without evidence of aneurysm or stenosis. The left   inferior pulmonary vein has an early branch. The left atrial appendage is   not adequately opacified to rule out   the presence of thrombus. There is moderate mitral regurgitation. 3. The right ventricle is normal in size with moderately reduced systolic   function. There is regional hypokinesis involving the basal inferior RV. 4. The right atrium is moderately dilated. There is mild to moderate   tricuspid regurgitation. 5. The aorta is at the upper limits of normal in size. 6. The pulmonary artery is normal in size. The coronary sinus is normal   in size. 7. There are small bilateral pleural effusions. Atelectasis is noted in   the posterior lung fields. 8. There is degenerative disc disease involving the thoracic vertebrae. 9. There is a 5 mm nodule of increased signal intensity visualized in the   liver.     Medication:  Current Outpatient Medications   Medication Sig Dispense Refill    lisinopril (PRINIVIL;ZESTRIL) 5 MG tablet Take 1 tablet by mouth daily 90 tablet 3    ergocalciferol (ERGOCALCIFEROL) 14266 units capsule Take 50,000 Units by mouth daily      oxybutynin (DITROPAN) 5 MG/5ML the plan. NOTE: This report was transcribed using voice recognition software. Every effort was made to ensure accuracy, however, inadvertent computerized transcription errors may be present. Stephani Jones MD, [facility]agen [facility]   Office: [phone]    Scribe attestation:  This note was scribed in the presence of Stephani Jones MD by Izzy Abraham RN    I, Dr. Stephani Jones personally performed the services described in this documentation as scribed by RN in my presence, and it is both accurate and complete.

## 2020-07-29 ENCOUNTER — OFFICE VISIT (OUTPATIENT)
Dept: CARDIOLOGY CLINIC | Age: 70
End: 2020-07-29
Payer: MEDICARE

## 2020-07-29 ENCOUNTER — NURSE ONLY (OUTPATIENT)
Dept: CARDIOLOGY CLINIC | Age: 70
End: 2020-07-29
Payer: MEDICARE

## 2020-07-29 VITALS
HEIGHT: 72 IN | BODY MASS INDEX: 28.31 KG/M2 | SYSTOLIC BLOOD PRESSURE: 149 MMHG | HEART RATE: 71 BPM | RESPIRATION RATE: 20 BRPM | DIASTOLIC BLOOD PRESSURE: 82 MMHG | WEIGHT: 209 LBS

## 2020-07-29 PROCEDURE — 99214 OFFICE O/P EST MOD 30 MIN: CPT | Performed by: INTERNAL MEDICINE

## 2020-07-29 PROCEDURE — 93291 INTERROG DEV EVAL SCRMS IP: CPT | Performed by: INTERNAL MEDICINE

## 2020-07-29 PROCEDURE — 4040F PNEUMOC VAC/ADMIN/RCVD: CPT | Performed by: INTERNAL MEDICINE

## 2020-07-29 PROCEDURE — 1036F TOBACCO NON-USER: CPT | Performed by: INTERNAL MEDICINE

## 2020-07-29 PROCEDURE — 1123F ACP DISCUSS/DSCN MKR DOCD: CPT | Performed by: INTERNAL MEDICINE

## 2020-07-29 PROCEDURE — 3017F COLORECTAL CA SCREEN DOC REV: CPT | Performed by: INTERNAL MEDICINE

## 2020-07-29 PROCEDURE — G8417 CALC BMI ABV UP PARAM F/U: HCPCS | Performed by: INTERNAL MEDICINE

## 2020-07-29 PROCEDURE — G8427 DOCREV CUR MEDS BY ELIG CLIN: HCPCS | Performed by: INTERNAL MEDICINE

## 2020-07-30 NOTE — PROGRESS NOTES
Patient comes in for interrogation of their implanted loop recorder. Interrogation shows symptom recordings and AF episodes to be SR with ectopy. Implanted for AF management. Patient remains on warfarin. Today we changed the ectopy rejection to aggressive. Please see interrogation for more detail. Patient will see Dr. Muir Covert and we will continue to follow the Patient remotely.

## 2020-08-31 ENCOUNTER — NURSE ONLY (OUTPATIENT)
Dept: CARDIOLOGY CLINIC | Age: 70
End: 2020-08-31
Payer: MEDICARE

## 2020-08-31 PROCEDURE — G2066 INTER DEVC REMOTE 30D: HCPCS | Performed by: INTERNAL MEDICINE

## 2020-08-31 PROCEDURE — 93298 REM INTERROG DEV EVAL SCRMS: CPT | Performed by: INTERNAL MEDICINE

## 2020-08-31 NOTE — LETTER
[address] [phone] 8800 [facility],4Th Floor [phone]    Pacemaker/Defibrillator Clinic          08/31/20        [address] [place] [address]        Dear Gricelda Ovalles    This letter is to inform you that we received the transmission from your monitor at home that checks your implanted heart device. The next date your monitor will automatically transmit will be 10-5-20. If your report needs attention we will notify you. Your device and monitor are wireless and most transmit cellularly, but please periodically check your monitor is still plugged in to the electrical outlet. If you still use the telephone land line to send please ensure the connection to the phone lavell is secure. This will help to ensure successful automatic transmissions in the future. Also, the monitor needs to be close to you while sleeping at night. Please be aware that the remote device transmission sites are periodically monitored only during regular business hours during which simultaneous in-office device clinics are being run. If your transmission requires attention, we will contact you as soon as possible. Thank you.             Celia [address]

## 2020-10-05 ENCOUNTER — NURSE ONLY (OUTPATIENT)
Dept: CARDIOLOGY CLINIC | Age: 70
End: 2020-10-05
Payer: MEDICARE

## 2020-10-05 PROCEDURE — G2066 INTER DEVC REMOTE 30D: HCPCS | Performed by: INTERNAL MEDICINE

## 2020-10-05 PROCEDURE — 93298 REM INTERROG DEV EVAL SCRMS: CPT | Performed by: INTERNAL MEDICINE

## 2020-10-05 NOTE — LETTER
[address] [facility] [phone]  [address]00 [facility],4Th Floor [phone]    Pacemaker/Defibrillator Clinic          10/05/20        [address] [place], [place] [id number]        Dear Kyle Wu    This letter is to inform you that we received the transmission from your monitor at home that checks your implanted heart device. The next date your monitor will automatically transmit will be 11-9-20. If your report needs attention we will notify you. Your device and monitor are wireless and most transmit cellularly, but please periodically check your monitor is still plugged in to the electrical outlet. If you still use the telephone land line to send please ensure the connection to the phone lavell is secure. This will help to ensure successful automatic transmissions in the future. Also, the monitor needs to be close to you while sleeping at night. Please be aware that the remote device transmission sites are periodically monitored only during regular business hours during which simultaneous in-office device clinics are being run. If your transmission requires attention, we will contact you as soon as possible. Thank you.             Celia [address]

## 2020-10-29 ENCOUNTER — TELEPHONE (OUTPATIENT)
Dept: CARDIOLOGY CLINIC | Age: 70
End: 2020-10-29

## 2020-10-29 NOTE — TELEPHONE ENCOUNTER
He has been on coumadin for years . His wife wants him to ask SHANTELL what he thinks about Eliquis ? Does shantell think he could take Eliquis?

## 2020-10-29 NOTE — TELEPHONE ENCOUNTER
Called pt. He is interested in stopping coumadin and starting DOAC. I discussed anticoagulation to decrease the risk of thromboembolic events including stroke. Benefits and alternatives were discussed with patient. Risk of bleeding was discussed. Patient verbalized understanding. Different forms of anticoagulants including coumadin, Pradaxa, Eliquis, and Xarelto were discussed. He is checking on the pricing and will let us know if the DOAC is affordable.     Thomas Carlin, APRN-CNP

## 2020-11-09 ENCOUNTER — NURSE ONLY (OUTPATIENT)
Dept: CARDIOLOGY CLINIC | Age: 70
End: 2020-11-09
Payer: MEDICARE

## 2020-11-09 PROCEDURE — G2066 INTER DEVC REMOTE 30D: HCPCS | Performed by: INTERNAL MEDICINE

## 2020-11-09 PROCEDURE — 93298 REM INTERROG DEV EVAL SCRMS: CPT | Performed by: INTERNAL MEDICINE

## 2020-12-03 ENCOUNTER — TELEPHONE (OUTPATIENT)
Dept: CARDIOLOGY CLINIC | Age: 70
End: 2020-12-03

## 2020-12-04 NOTE — TELEPHONE ENCOUNTER
Advise patient if he is interested we can change him to a different medication but with medicare they can be quite expensive.  We can try if he wants

## 2020-12-14 ENCOUNTER — NURSE ONLY (OUTPATIENT)
Dept: CARDIOLOGY CLINIC | Age: 70
End: 2020-12-14
Payer: MEDICARE

## 2020-12-14 PROCEDURE — G2066 INTER DEVC REMOTE 30D: HCPCS | Performed by: INTERNAL MEDICINE

## 2020-12-14 PROCEDURE — 93298 REM INTERROG DEV EVAL SCRMS: CPT | Performed by: INTERNAL MEDICINE

## 2020-12-14 NOTE — PROGRESS NOTES
We received a remote transmission form patient's monitor at home. Remote Linq report shows no arrhythmias. Pt remains on coumadin for AF in past. EP physician to review. We will continue to monitor remotely.

## 2020-12-17 RX ORDER — LISINOPRIL 5 MG/1
5 TABLET ORAL DAILY
Qty: 90 TABLET | Refills: 3 | Status: SHIPPED | OUTPATIENT
Start: 2020-12-17 | End: 2022-01-05 | Stop reason: SDUPTHER

## 2020-12-17 NOTE — TELEPHONE ENCOUNTER
Received refill request for Lisinopril from [facility].      Last OV: 07/29/2020 w/ SHANTELL    Last Refill: 01/03/2020 #90 w/ 3 refills    Next Appointment: 04/29/2021 w/ SHANTELL

## 2020-12-28 ENCOUNTER — TELEPHONE (OUTPATIENT)
Dept: CARDIOLOGY CLINIC | Age: 70
End: 2020-12-28

## 2020-12-29 NOTE — TELEPHONE ENCOUNTER
Spoke with Shani Branham at [facility] as this was the call back number on the letter the patient received in regards to trouble with medication . She states that there is no issues w/ dispensing the medication or any open claims on the medications . Called and advised the pt of this message and notified him to call our office back with any further issues. Patient voiced understanding .

## 2021-01-13 ENCOUNTER — OFFICE VISIT (OUTPATIENT)
Dept: ORTHOPEDIC SURGERY | Age: 71
End: 2021-01-13
Payer: MEDICARE

## 2021-01-13 VITALS — BODY MASS INDEX: 28.85 KG/M2 | HEIGHT: 72 IN | WEIGHT: 213 LBS

## 2021-01-13 DIAGNOSIS — M76.71 PERONEAL TENDINITIS, RIGHT: ICD-10-CM

## 2021-01-13 DIAGNOSIS — M79.671 RIGHT FOOT PAIN: Primary | ICD-10-CM

## 2021-01-13 DIAGNOSIS — M84.374A STRESS REACTION OF RIGHT FOOT, INITIAL ENCOUNTER: ICD-10-CM

## 2021-01-13 PROCEDURE — 99203 OFFICE O/P NEW LOW 30 MIN: CPT | Performed by: ORTHOPAEDIC SURGERY

## 2021-01-13 NOTE — PROGRESS NOTES
[address] [facility]  Outpatient Progress Note  [facility] CYNDEE Badillo MD    Patient Name: Rosie Robb MRN: [id number]   Age: 79 y.o. YOB: 1950   Sex: male      [address] Complaint   Patient presents with    Pain     RIGHT FOOT - ONSET 3-4 MONTHS        HISTORY OF PRESENT ILLNESS   Rosie Robb is a 79 y.o. male presents the office today for evaluation of complaints of pain in the lateral border of his right midfoot. Symptoms began three or 4 months ago. There is no specific injury. He does enjoy walking and hiking and had been walking about 3 to 4 miles five or 6 days a week. Once he developed this discomfort he had to stop that activity. He had been seeing a podiatrist who thought he may have tendinitis and put him in a walking cast boot which she has worn for the last six or 7 weeks. His pain did not significantly improve so an MRI scan was ordered. He was then told he had a bone bruise or stress reaction and was indicated to continue in the walking cast boot. He presents the office today essentially for second opinion consultation. He does report some improvement in pain symptoms and notes the swelling in the side of the foot has improved and he is able to spend short periods of time in a regular shoe.     PAST MEDICAL HISTORY      Past Medical History:   Diagnosis Date    Atrial fibrillation ([address]     Cancer ([facility]     CHF (congestive heart failure) (HCC)     Idiopathic transverse myelitis (HCC)        PAST SURGICAL HISTORY     Past Surgical History:   Procedure Laterality Date    COLONOSCOPY      HERNIA REPAIR      UPPER GASTROINTESTINAL ENDOSCOPY      VASECTOMY         MEDICATIONS     Current Outpatient Medications   Medication Sig Dispense Refill    lisinopril (PRINIVIL;ZESTRIL) 5 MG tablet Take 1 tablet by mouth daily 90 tablet 3    ergocalciferol (ERGOCALCIFEROL) 04755 units capsule Take 50,000 Units by mouth daily  oxybutynin (DITROPAN) 5 MG/5ML syrup Take 7.5 mg by mouth 2 times daily. 7.5ml at night 2.5ml in the  morning      tamsulosin (FLOMAX) 0.4 MG capsule Take 0.4 mg by mouth daily.  warfarin (COUMADIN) 5 MG tablet Take 5 mg by mouth daily. Takes Tues. Thursday,Saturday and sunday      warfarin (COUMADIN) 7.5 MG tablet Take 7.5 mg by mouth. Monday Wednesday and Friday      Multiple Vitamins-Minerals (THERAPEUTIC MULTIVITAMIN-MINERALS) tablet Take 2 tablets by mouth daily. No current facility-administered medications for this visit.         ALLERGIES   No Known Allergies    FAMILY HISTORY     Family History   Problem Relation Age of Onset    Prostate Cancer Father        SOCIAL HISTORY     Social History     Socioeconomic History    Marital status:      Spouse name: None    Number of children: None    Years of education: None    Highest education level: None   Occupational History    None   Social Needs    Financial resource strain: None    Food insecurity     Worry: None     Inability: None    Transportation needs     Medical: None     Non-medical: None   Tobacco Use    Smoking status: Never Smoker    Smokeless tobacco: Never Used   Substance and Sexual Activity    Alcohol use: Yes     Comment: socially    Drug use: No    Sexual activity: Yes     Partners: Female   Lifestyle    Physical activity     Days per week: None     Minutes per session: None    Stress: None   Relationships    Social connections     Talks on phone: None     Gets together: None     Attends Restorationism service: None     Active member of club or organization: None     Attends meetings of clubs or organizations: None     Relationship status: None    Intimate partner violence     Fear of current or ex partner: None     Emotionally abused: None     Physically abused: None     Forced sexual activity: None   Other Topics Concern    None   Social History Narrative    None       REVIEW OF SYSTEMS General: no fever, chills, night sweats, anorexia, malaise, fatigue, or weight change  Hematologic:  no unexplained bleeding or bruising  HEENT:   no nasal congestion, rhinorrhea, sore throat, or facial pain  Respiratory:  no cough, dyspnea, or chest pain  Cardiovascular:  no angina, SMITH, PND, orthopnea, dependent edema, or palpitations  Gastrointestinal:  no nausea, vomiting, diarrhea, constipation, or abdominal pain  Genitourinary:  no urinary urgency, frequency, dysuria, or hematuria  Musculoskeletal: see HPI  Endocrine:  no heat or cold intolerance and no polyphagia, polydipsia, or polyuria  Skin:  no skin eruptions or changing lesions  Neurologic:  no focal weakness, numbness/tingling, tremor, or severe headache. See HPI. See HPI for pertinent positives. PHYSICAL EXAM   Vital Signs:   Vitals:    01/13/21 1017   Weight: 213 lb (96.6 kg)   Height: 6' (1.829 m)       General appearance: healthy, alert, no distress  Skin: Skin color, texture, turgor normal. No rashes or lesions  HEENT: atraumatic, normocephalic. PERRL  Respiratory: Unlabored breathing  Lymphatic: No adenopathy   Neuro: Alert and oriented, normal distal sensation, normal bilateral DTRs  Vascular: Normal distal capillary and distal pulses  Muskuloskeletal Exam: Right foot examination demonstrates no significant swelling erythema warmth ecchymosis or edema. There is mild tenderness to palpation at the base of the fifth metatarsal and at the peroneus brevis tendon insertion. Weightbearing alignment of the ankle hindfoot midfoot and forefoot are normal.  His gait is slightly antalgic to the right. He has normal strength in ankle eversion. No pain with resisted ankle eversion. There is no ligament instability. RADIOLOGY   X-rays obtained and reviewed in office:  Views three views of the right foot taken at an outside institution were negative for acute bony abnormality. MRI scan of the right foot done at [facility] approximately 1 week ago shows mild tendinitis of the distal peroneus brevis tendon as well as an exuberant stress reaction with bone marrow edema noted at the base of the fifth metatarsal but there was no teena fracture. IMPRESSION     1. Right foot pain    2. Stress reaction of right foot, initial encounter    3. Peroneal tendinitis, right         PLAN   I had a lengthy discussion with patient today regarding diagnosis and treatment options and recommendations. I think the patient is being treated appropriately with immobilization in a removable walking cast boot. I would anticipate complete resolution of symptoms and complete healing of the stress reaction given enough time and rest.  Since he is symptomatically improved, at his discretion, he can start to transition from the cast boot into a sturdy accommodative athletic shoe but I have recommended low impact activities. FOLLOWUP     Return in about 6 weeks (around 2/24/2021) for Reevaluation. No orders of the defined types were placed in this encounter. No orders of the defined types were placed in this encounter.       Patient was instructed on appropriate use of braces, participation in home exercise programs, healthy lifestyle choices and weight loss as appropriate     Giovanna Stevens MD

## 2021-01-18 ENCOUNTER — NURSE ONLY (OUTPATIENT)
Dept: CARDIOLOGY CLINIC | Age: 71
End: 2021-01-18
Payer: MEDICARE

## 2021-01-18 DIAGNOSIS — Z45.09 ENCOUNTER FOR ELECTRONIC ANALYSIS OF REVEAL EVENT RECORDER: ICD-10-CM

## 2021-01-18 DIAGNOSIS — I48.0 PAF (PAROXYSMAL ATRIAL FIBRILLATION) (HCC): ICD-10-CM

## 2021-01-18 PROCEDURE — 93298 REM INTERROG DEV EVAL SCRMS: CPT | Performed by: INTERNAL MEDICINE

## 2021-01-18 PROCEDURE — G2066 INTER DEVC REMOTE 30D: HCPCS | Performed by: INTERNAL MEDICINE

## 2021-01-18 NOTE — LETTER
06[address] [facility] [phone] 8800 [facility],4Th Floor [phone]    Pacemaker/Defibrillator Clinic          01/17/21        [address] [place], [place] [id number]        Dear Tin Prior    This letter is to inform you that we received the transmission from your monitor at home that checks your implanted heart device. The next date your monitor will automatically transmit will be 2-22-21. If your report needs attention we will notify you. Your device and monitor are wireless and most transmit cellularly, but please periodically check your monitor is still plugged in to the electrical outlet. If you still use the telephone land line to send please ensure the connection to the phone lavell is secure. This will help to ensure successful automatic transmissions in the future. Also, the monitor needs to be close to you while sleeping at night. Please be aware that the remote device transmission sites are periodically monitored only during regular business hours during which simultaneous in-office device clinics are being run. If your transmission requires attention, we will contact you as soon as possible. Thank you.             Celia [address]

## 2021-02-20 PROCEDURE — 93298 REM INTERROG DEV EVAL SCRMS: CPT | Performed by: INTERNAL MEDICINE

## 2021-02-20 PROCEDURE — G2066 INTER DEVC REMOTE 30D: HCPCS | Performed by: INTERNAL MEDICINE

## 2021-02-22 ENCOUNTER — NURSE ONLY (OUTPATIENT)
Dept: CARDIOLOGY CLINIC | Age: 71
End: 2021-02-22
Payer: MEDICARE

## 2021-02-22 DIAGNOSIS — Z45.09 ENCOUNTER FOR ELECTRONIC ANALYSIS OF REVEAL EVENT RECORDER: ICD-10-CM

## 2021-02-22 DIAGNOSIS — I48.0 PAF (PAROXYSMAL ATRIAL FIBRILLATION) (HCC): ICD-10-CM

## 2021-02-22 NOTE — LETTER
47[address] [facility] [phone] 8800 [facility],4Th Floor [phone]    Pacemaker/Defibrillator Clinic          02/22/21        [address] [place], [place] [id number]        Dear Nilda Corral    This letter is to inform you that we received the transmission from your monitor at home that checks your implanted heart device. The next date your monitor will automatically transmit will be 3-29-21. If your report needs attention we will notify you. Your device and monitor are wireless and most transmit cellularly, but please periodically check your monitor is still plugged in to the electrical outlet. If you still use the telephone land line to send please ensure the connection to the phone lavell is secure. This will help to ensure successful automatic transmissions in the future. Also, the monitor needs to be close to you while sleeping at night. Please be aware that the remote device transmission sites are periodically monitored only during regular business hours during which simultaneous in-office device clinics are being run. If your transmission requires attention, we will contact you as soon as possible. Thank you.             [facility]

## 2021-03-29 ENCOUNTER — TELEPHONE (OUTPATIENT)
Dept: CARDIOLOGY CLINIC | Age: 71
End: 2021-03-29

## 2021-04-27 ENCOUNTER — NURSE ONLY (OUTPATIENT)
Dept: CARDIOLOGY CLINIC | Age: 71
End: 2021-04-27
Payer: MEDICARE

## 2021-04-27 DIAGNOSIS — Z45.09 ENCOUNTER FOR ELECTRONIC ANALYSIS OF REVEAL EVENT RECORDER: ICD-10-CM

## 2021-04-27 DIAGNOSIS — I48.0 PAF (PAROXYSMAL ATRIAL FIBRILLATION) (HCC): ICD-10-CM

## 2021-04-27 NOTE — LETTER
[address] [phone] 8800 [facility],4Th Floor [phone]    Pacemaker/Defibrillator Clinic          04/27/21        [address] [place] [address]        Dear Sandra Benitez    This letter is to inform you that we received the transmission from your monitor at home that checks your implanted heart device. The next date your monitor will automatically transmit will be 6-1-21. If your report needs attention we will notify you. Your device and monitor are wireless and most transmit cellularly, but please periodically check your monitor is still plugged in to the electrical outlet. If you still use the telephone land line to send please ensure the connection to the phone lavell is secure. This will help to ensure successful automatic transmissions in the future. Also, the monitor needs to be close to you while sleeping at night. Please be aware that the remote device transmission sites are periodically monitored only during regular business hours during which simultaneous in-office device clinics are being run. If your transmission requires attention, we will contact you as soon as possible. Thank you.             Celia [address]

## 2021-04-28 NOTE — PROGRESS NOTES
[address]   Electrophysiology Follow up   Date: 4/29/2021      Chief Complaint   Patient presents with    Atrial Fibrillation     no cardiac complaints at this time        HPI: Genevieve Varma is a 79 y.o. male being seen in follow up for maintenance evaluation for atrial fibrillation. He has a PMH of Afib, CHF, and MS. He had onset of irregular heart beats in 2010, but atrial fib was not diagnosed until later. He underwent cardioversion twice - January 15, 2013 and January 30, 2013. He had an ablation of atrial fibrillation with cryo balloon in March of 2012. With a hospitalization 12/2017 at [facility] with idiopathic transverse myelitis. He follows up with Dr. Yessi Pompa for this. Other than complains of weakness and feeling of burning/tingling sensation in hands and feet he feels otherwise well. Interval History: Neal Burrell presents today in follow up. States his sleeping is not good and he is feeling fatigued. He has gained some of his weight back and he is not as active as he used to be . Past Medical History:   Diagnosis Date    Atrial fibrillation ([address]     Cancer ([address]     CHF (congestive heart failure) (HCC)     Idiopathic transverse myelitis (HCC)         Past Surgical History:   Procedure Laterality Date    COLONOSCOPY      HERNIA REPAIR      UPPER GASTROINTESTINAL ENDOSCOPY      VASECTOMY         Allergies:  No Known Allergies    Social History:   reports that he has never smoked. He has never used smokeless tobacco. He reports current alcohol use. He reports that he does not use drugs. Family History:  family history includes Prostate Cancer in his father. Reviewed. Denies family history of sudden cardiac death, arrhythmia, premature CAD    Review of System:  All other systems reviewed and are negative except for that noted above.  Pertinent negatives are:   General: negative for fever, chills   Ophthalmic ROS: negative for - eye pain or loss of vision  ENT ROS: negative for - headaches, sore throat   Respiratory: negative for - cough, sputum  Cardiovascular: Reviewed in HPI  Gastrointestinal: negative for - abdominal pain, diarrhea, N/V  Hematology: negative for - bleeding, blood clots, bruising or jaundice  Genito-Urinary:  negative for - Dysuria or incontinence  Musculoskeletal: negative for - Joint swelling, muscle pain  Neurological: negative for - confusion, dizziness, headaches   Psychiatric: No anxiety, no depression. Dermatological: negative for - rash    Physical Examination:  Vitals:    04/29/21 1013   BP: 117/78   Pulse: 70   SpO2: 97%      Wt Readings from Last 3 Encounters:   04/29/21 220 lb 12.8 oz (100.2 kg)   01/13/21 213 lb (96.6 kg)   07/29/20 209 lb (94.8 kg)       · Constitutional: Oriented. No distress. · Head: Normocephalic and atraumatic. · Mouth/Throat: Oropharynx is clear and moist.   · Eyes: Conjunctivae normal. EOM are normal.   · Neck: Neck supple. No rigidity. No JVD present. · Cardiovascular: Normal rate, regular rhythm, S1&S2. · Pulmonary/Chest: Bilateral respiratory sounds. No wheezes, No rhonchi. · Abdominal: Soft. Bowel sounds present. No distension, No tenderness. · Musculoskeletal: No tenderness. No edema    · Lymphadenopathy: Has no cervical adenopathy. · Neurological: Alert and oriented. Cranial nerve appears intact, No Gross deficit   · Skin: Skin is warm and dry. No rash noted. · Psychiatric: Has a normal behavior     Labs, diagnostic and imaging results reviewed. Reviewed. No results found for: TSHREFLEX, TSH, CREATININE, AMIOD, AST, ALT      ECG 4/29/2021:   Sinus with PVC     Echo 03/03/2021   Study Conclusions    - Left ventricle: The cavity size is normal. Wall thickness was increased in a   pattern of mild LVH.    Systolic function was normal. The estimated ejection fraction was in the   range of 50% to 55%. Wall    motion was normal; there were no regional wall motion abnormalities.    Features are consistent with    a pseudonormal left ventricular filling pattern, with concomitant abnormal   relaxation and    increased filling pressure (grade 2 diastolic dysfunction). - Mitral valve: Mild regurgitation.  - Left atrium: The atrium is mildly dilated. - Right ventricle: Systolic function was normal. TAPSE: 2.5cm. Tricuspid   annular systolic velocity:    47GP/T.  - Pulmonary arteries: Systolic pressure was within the normal range, estimated   to be 30mm Hg    assuming that the right atrial pressure was 3 mmHg. - Inferior vena cava: The vessel was normal in size. The respirophasic   diameter changes were in the    normal range (>= 50%). Impressions:    - Prior study date 05/2017.  - Compared to the prior study, there has been no significant interval change. ECHO 5/8/2017  - Left ventricle: The cavity size was normal. Wall thickness was      normal. Systolic function was normal. The estimated ejection      fraction was in the range of 55% to 60%. Wall motion was normal;      there were no regional wall motion abnormalities. The study is not      technically sufficient to allow evaluation of LV diastolic function.    - Mitral valve: Mild regurgitation.    - Right ventricle: Systolic function was normal by objective interpretation.    - Pulmonic valve: Peak gradient (S): 4mm Hg. Echo 1/11/13  - Study data: No prior study was available for comparison. - Left ventricle: The cavity size was mildly dilated. Wall thickness  was normal. Systolic function was at the lower limits of normal.  The estimated ejection fraction was 50%. Wall motion was normal;  there were no regional wall motion abnormalities. The study is not  technically sufficient to allow evaluation of LV diastolic  function.  - Mitral valve: Mild regurgitation.  - Left atrium: The atrium was mildly dilated. - Right ventricle: Systolic function was normal by visual  assessment.  - Right atrium: The atrium was mildly dilated.       Stress 4.2019 FINAL INTERPRETATION  There is probably normal myocardial perfusion. However, there is mild left ventricular dysfunction and extensive areas of attenuation as described below. Overall study quality is fair.  Scan significance indicates low to moderate cardiac risk. There is evidence of diaphragmatic attenuation and sub-diaphragmatic interfering activity. PERFUSION FINDINGS  There is a medium to large sized area of moderately decreased perfusion in the basal inferior, basal inferoseptal, basal inferolateral, mid inferior, and apical inferior walls at rest with improvement with stress most consistent with extensive artifact. There is no evidence of visual transient dilation with a normal stress/rest left ventricular volume ratio of 1.07. The sum stress score is 0 (normal: less than 4, mildly abnormal: 4-8, severely abnormal: greater than 8). The right ventricle is mildly dilated. FUNCTION FINDINGS  The calculated left ventricular ejection fraction at rest is mildly reduced at 47% with an end diastolic volume of 609 mL and end systolic volume of 93 mL. MRI 2/12/13  IMPRESSION:  1. The left ventricle is mildly dilated with moderate to severely reduced   systolic function. There is global mild to moderate hypokinesis with   severe hypokinesis involving the basal septal and inferior, mid septal and   inferior and apical inferior   segments. There is no evidence of myocardial edema by T2 weighted   imaging. There is no DGE. LV function consistent with significant cardiomyopathy, may be secondary   to tachycardia (atrial fibrillation). However, can not rule out ischemic   cardiomyopathy given regional hypokinesis. Recommend ischemic evaluation. 2. The left atrium is mildly dilated. There are two right and two left   pulmonary veins without evidence of aneurysm or stenosis. The left   inferior pulmonary vein has an early branch.  The left atrial appendage is   not adequately opacified to rule out   the

## 2021-04-29 ENCOUNTER — NURSE ONLY (OUTPATIENT)
Dept: CARDIOLOGY CLINIC | Age: 71
End: 2021-04-29

## 2021-04-29 ENCOUNTER — OFFICE VISIT (OUTPATIENT)
Dept: CARDIOLOGY CLINIC | Age: 71
End: 2021-04-29
Payer: MEDICARE

## 2021-04-29 VITALS
DIASTOLIC BLOOD PRESSURE: 78 MMHG | OXYGEN SATURATION: 97 % | HEART RATE: 70 BPM | HEIGHT: 72 IN | SYSTOLIC BLOOD PRESSURE: 117 MMHG | WEIGHT: 220.8 LBS | BODY MASS INDEX: 29.91 KG/M2

## 2021-04-29 DIAGNOSIS — I48.0 PAF (PAROXYSMAL ATRIAL FIBRILLATION) (HCC): Primary | ICD-10-CM

## 2021-04-29 DIAGNOSIS — Z45.09 ENCOUNTER FOR ELECTRONIC ANALYSIS OF REVEAL EVENT RECORDER: ICD-10-CM

## 2021-04-29 DIAGNOSIS — Z95.818 STATUS POST PLACEMENT OF IMPLANTABLE LOOP RECORDER: ICD-10-CM

## 2021-04-29 DIAGNOSIS — I42.0 DILATED CARDIOMYOPATHY (HCC): ICD-10-CM

## 2021-04-29 DIAGNOSIS — R00.1 BRADYCARDIA: ICD-10-CM

## 2021-04-29 DIAGNOSIS — R06.02 SOB (SHORTNESS OF BREATH): ICD-10-CM

## 2021-04-29 PROCEDURE — 1123F ACP DISCUSS/DSCN MKR DOCD: CPT | Performed by: INTERNAL MEDICINE

## 2021-04-29 PROCEDURE — 1036F TOBACCO NON-USER: CPT | Performed by: INTERNAL MEDICINE

## 2021-04-29 PROCEDURE — G8417 CALC BMI ABV UP PARAM F/U: HCPCS | Performed by: INTERNAL MEDICINE

## 2021-04-29 PROCEDURE — G8427 DOCREV CUR MEDS BY ELIG CLIN: HCPCS | Performed by: INTERNAL MEDICINE

## 2021-04-29 PROCEDURE — 99214 OFFICE O/P EST MOD 30 MIN: CPT | Performed by: INTERNAL MEDICINE

## 2021-04-29 PROCEDURE — 4040F PNEUMOC VAC/ADMIN/RCVD: CPT | Performed by: INTERNAL MEDICINE

## 2021-04-29 PROCEDURE — 3017F COLORECTAL CA SCREEN DOC REV: CPT | Performed by: INTERNAL MEDICINE

## 2021-04-29 PROCEDURE — 93000 ELECTROCARDIOGRAM COMPLETE: CPT | Performed by: INTERNAL MEDICINE

## 2021-04-29 NOTE — PROGRESS NOTES
Patient comes in for interrogation of their implanted loop recorder. Interrogation shows 3 symptom recordings that all appear to be Ectopy. Implanted for AF management. Patient remains on warfarin. Today we turned AF episodes to All. Please see interrogation for more detail. Patient will see Dr. Andrea Ramos today and we will continue to follow the Patient remotely.

## 2021-05-07 PROCEDURE — G2066 INTER DEVC REMOTE 30D: HCPCS | Performed by: INTERNAL MEDICINE

## 2021-05-07 PROCEDURE — 93298 REM INTERROG DEV EVAL SCRMS: CPT | Performed by: INTERNAL MEDICINE

## 2021-05-28 ENCOUNTER — TELEPHONE (OUTPATIENT)
Dept: CARDIOLOGY CLINIC | Age: 71
End: 2021-05-28

## 2021-05-28 NOTE — TELEPHONE ENCOUNTER
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 She will forward message to him. Notified her to call office with any other questions . She voiced understanding.  Call complete

## 2021-05-28 NOTE — TELEPHONE ENCOUNTER
Pt called Dr Colette Pacheco was talking about changing his  warfarin/ COUMADIN 7.5  to [address] or ELIQUIS    He called his insurance to get the cost they need the does he would be on for them to be able to give him a price. He said if he is not at home leave the information on his machine.       pls advise thank you

## 2021-06-01 ENCOUNTER — NURSE ONLY (OUTPATIENT)
Dept: CARDIOLOGY CLINIC | Age: 71
End: 2021-06-01

## 2021-06-01 DIAGNOSIS — Z45.09 ENCOUNTER FOR ELECTRONIC ANALYSIS OF REVEAL EVENT RECORDER: ICD-10-CM

## 2021-06-01 DIAGNOSIS — I48.0 PAF (PAROXYSMAL ATRIAL FIBRILLATION) (HCC): ICD-10-CM

## 2021-06-01 NOTE — LETTER
[address] [facility] [phone] [address] [facility],[address] [phone]    Pacemaker/Defibrillator Clinic    06/01/21      [address] [place], [place] [id number]      Dear Ina Cartwright    This letter is to inform you that we received the transmission from your monitor at home that checks your implanted heart device. The next date your monitor will automatically transmit will be 7-6-21. If your report needs attention we will notify you. Your device and monitor are wireless and most transmit cellularly, but please periodically check your monitor is still plugged in to the electrical outlet. If you still use the telephone land line to send please ensure the connection to the phone lavell is secure. This will help to ensure successful automatic transmissions in the future. Also, the monitor needs to be close to you while sleeping at night. Please be aware that the remote device transmission sites are periodically monitored only during regular business hours during which simultaneous in-office device clinics are being run. If your transmission requires attention, we will contact you as soon as possible. **PLEASE NOTE** that our [facility] policy and processes are changing to ensure a more seamless approach for all parties involved, allowing more time for our nurses to address patient issues and concerns. We will no longer be sending letters for NORMAL remote transmissions. You will be contacted by phone if your transmission requires attention (as previously done), and letters will only be sent regarding monitor disconnections or missed transmissions if you are unable to be reached by phone. Please do not be alarmed by this new process, as we will continue to contact you if your transmission report requires attention. This will be your final \"remote received\" letter.   From this point forward, the [facility] will be utilizing the no news is good news approach. As always, please feel free to contact your nurse with any questions or concerns. Thank you.     Celia Bales

## 2021-07-06 ENCOUNTER — NURSE ONLY (OUTPATIENT)
Dept: CARDIOLOGY CLINIC | Age: 71
End: 2021-07-06

## 2021-07-06 DIAGNOSIS — I48.0 PAF (PAROXYSMAL ATRIAL FIBRILLATION) (HCC): ICD-10-CM

## 2021-07-06 DIAGNOSIS — Z45.09 ENCOUNTER FOR ELECTRONIC ANALYSIS OF REVEAL EVENT RECORDER: ICD-10-CM

## 2021-07-10 PROCEDURE — 93298 REM INTERROG DEV EVAL SCRMS: CPT | Performed by: INTERNAL MEDICINE

## 2021-07-10 PROCEDURE — G2066 INTER DEVC REMOTE 30D: HCPCS | Performed by: INTERNAL MEDICINE

## 2021-08-10 ENCOUNTER — NURSE ONLY (OUTPATIENT)
Dept: CARDIOLOGY CLINIC | Age: 71
End: 2021-08-10
Payer: MEDICARE

## 2021-08-10 DIAGNOSIS — Z45.09 ENCOUNTER FOR ELECTRONIC ANALYSIS OF REVEAL EVENT RECORDER: ICD-10-CM

## 2021-08-10 DIAGNOSIS — I48.0 PAF (PAROXYSMAL ATRIAL FIBRILLATION) (HCC): ICD-10-CM

## 2021-08-10 PROCEDURE — 93298 REM INTERROG DEV EVAL SCRMS: CPT | Performed by: INTERNAL MEDICINE

## 2021-08-10 PROCEDURE — G2066 INTER DEVC REMOTE 30D: HCPCS | Performed by: INTERNAL MEDICINE

## 2021-09-14 ENCOUNTER — NURSE ONLY (OUTPATIENT)
Dept: CARDIOLOGY CLINIC | Age: 71
End: 2021-09-14
Payer: MEDICARE

## 2021-09-14 DIAGNOSIS — Z45.09 ENCOUNTER FOR ELECTRONIC ANALYSIS OF REVEAL EVENT RECORDER: ICD-10-CM

## 2021-09-14 DIAGNOSIS — I48.0 PAF (PAROXYSMAL ATRIAL FIBRILLATION) (HCC): ICD-10-CM

## 2021-09-14 PROCEDURE — 93298 REM INTERROG DEV EVAL SCRMS: CPT | Performed by: INTERNAL MEDICINE

## 2021-09-14 PROCEDURE — G2066 INTER DEVC REMOTE 30D: HCPCS | Performed by: INTERNAL MEDICINE

## 2021-10-16 PROCEDURE — G2066 INTER DEVC REMOTE 30D: HCPCS | Performed by: INTERNAL MEDICINE

## 2021-10-16 PROCEDURE — 93298 REM INTERROG DEV EVAL SCRMS: CPT | Performed by: INTERNAL MEDICINE

## 2021-10-19 ENCOUNTER — NURSE ONLY (OUTPATIENT)
Dept: CARDIOLOGY CLINIC | Age: 71
End: 2021-10-19
Payer: MEDICARE

## 2021-10-19 DIAGNOSIS — Z45.09 ENCOUNTER FOR ELECTRONIC ANALYSIS OF REVEAL EVENT RECORDER: ICD-10-CM

## 2021-10-19 DIAGNOSIS — I48.0 PAF (PAROXYSMAL ATRIAL FIBRILLATION) (HCC): ICD-10-CM

## 2021-11-23 ENCOUNTER — NURSE ONLY (OUTPATIENT)
Dept: CARDIOLOGY CLINIC | Age: 71
End: 2021-11-23
Payer: MEDICARE

## 2021-11-23 DIAGNOSIS — I48.0 PAF (PAROXYSMAL ATRIAL FIBRILLATION) (HCC): ICD-10-CM

## 2021-11-23 DIAGNOSIS — Z45.09 ENCOUNTER FOR ELECTRONIC ANALYSIS OF REVEAL EVENT RECORDER: ICD-10-CM

## 2021-11-23 PROCEDURE — G2066 INTER DEVC REMOTE 30D: HCPCS | Performed by: INTERNAL MEDICINE

## 2021-11-23 PROCEDURE — 93298 REM INTERROG DEV EVAL SCRMS: CPT | Performed by: INTERNAL MEDICINE

## 2021-12-28 ENCOUNTER — NURSE ONLY (OUTPATIENT)
Dept: CARDIOLOGY CLINIC | Age: 71
End: 2021-12-28
Payer: MEDICARE

## 2021-12-28 DIAGNOSIS — Z45.09 ENCOUNTER FOR ELECTRONIC ANALYSIS OF REVEAL EVENT RECORDER: ICD-10-CM

## 2021-12-28 DIAGNOSIS — I48.0 PAF (PAROXYSMAL ATRIAL FIBRILLATION) (HCC): ICD-10-CM

## 2021-12-30 PROCEDURE — 93298 REM INTERROG DEV EVAL SCRMS: CPT | Performed by: INTERNAL MEDICINE

## 2021-12-30 PROCEDURE — G2066 INTER DEVC REMOTE 30D: HCPCS | Performed by: INTERNAL MEDICINE

## 2022-01-05 RX ORDER — LISINOPRIL 5 MG/1
5 TABLET ORAL DAILY
Qty: 90 TABLET | Refills: 3 | Status: SHIPPED | OUTPATIENT
Start: 2022-01-05 | End: 2022-08-15

## 2022-01-24 RX ORDER — WARFARIN SODIUM 5 MG/1
TABLET ORAL
Qty: 135 TABLET | Refills: 3 | Status: SHIPPED | OUTPATIENT
Start: 2022-01-24 | End: 2022-06-02

## 2022-01-24 NOTE — TELEPHONE ENCOUNTER
Received refill request for Warfarin from [facility] O[facility].      Last OV: 04/29/2021 w/ SHANTELL    Last Labs: INR managed at [facility] [address]    Last Refill: historical    Next Appointment: 05/05/2022 [place]/ SHANTELL

## 2022-02-01 ENCOUNTER — NURSE ONLY (OUTPATIENT)
Dept: CARDIOLOGY CLINIC | Age: 72
End: 2022-02-01
Payer: MEDICARE

## 2022-02-01 DIAGNOSIS — Z45.09 ENCOUNTER FOR ELECTRONIC ANALYSIS OF REVEAL EVENT RECORDER: ICD-10-CM

## 2022-02-01 DIAGNOSIS — I48.0 PAF (PAROXYSMAL ATRIAL FIBRILLATION) (HCC): ICD-10-CM

## 2022-02-01 PROCEDURE — 93298 REM INTERROG DEV EVAL SCRMS: CPT | Performed by: INTERNAL MEDICINE

## 2022-02-01 PROCEDURE — G2066 INTER DEVC REMOTE 30D: HCPCS | Performed by: INTERNAL MEDICINE

## 2022-02-01 NOTE — PROGRESS NOTES
We received a remote transmission from patient's monitor at home. Remote Linq report shows no arrhythmias. AF is not real. Noted artifact. EP physician to review. We will continue to monitor remotely.

## 2022-02-11 NOTE — PROGRESS NOTES
We received a remote transmission from patient's monitor at home. Remote Linq report shows NSVT. Pt showed no symptoms. AF is not real. EP physician to review. We will continue to monitor remotely. [No Acute Distress] : no acute distress [Well Nourished] : well nourished [Well Developed] : well developed [Well-Appearing] : well-appearing [Normal Voice/Communication] : normal voice/communication [Normal Sclera/Conjunctiva] : normal sclera/conjunctiva [PERRL] : pupils equal round and reactive to light [EOMI] : extraocular movements intact [Normal Outer Ear/Nose] : the outer ears and nose were normal in appearance [Normal Oropharynx] : the oropharynx was normal [Normal TMs] : both tympanic membranes were normal [Normal Nasal Mucosa] : the nasal mucosa was normal [No JVD] : no jugular venous distention [No Lymphadenopathy] : no lymphadenopathy [Supple] : supple [Thyroid Normal, No Nodules] : the thyroid was normal and there were no nodules present [No Respiratory Distress] : no respiratory distress  [No Accessory Muscle Use] : no accessory muscle use [Clear to Auscultation] : lungs were clear to auscultation bilaterally [Normal Rate] : normal rate  [Regular Rhythm] : with a regular rhythm [Normal S1, S2] : normal S1 and S2 [No Murmur] : no murmur heard [No Carotid Bruits] : no carotid bruits [No Varicosities] : no varicosities [No Edema] : there was no peripheral edema [No Extremity Clubbing/Cyanosis] : no extremity clubbing/cyanosis [Soft] : abdomen soft [Non Tender] : non-tender [Non-distended] : non-distended [No Masses] : no abdominal mass palpated [No HSM] : no HSM [Normal Bowel Sounds] : normal bowel sounds [No Spinal Tenderness] : no spinal tenderness [No Rash] : no rash [No Focal Deficits] : no focal deficits [Normal Affect] : the affect was normal [Normal Mood] : the mood was normal [Normal Insight/Judgement] : insight and judgment were intact [No Joint Swelling] : no joint swelling [No Skin Lesions] : no skin lesions [de-identified] : B/L hands with no tenderness or swelling, left show with full ROM, no swelling

## 2022-03-08 ENCOUNTER — NURSE ONLY (OUTPATIENT)
Dept: CARDIOLOGY CLINIC | Age: 72
End: 2022-03-08
Payer: MEDICARE

## 2022-03-08 DIAGNOSIS — I48.0 PAF (PAROXYSMAL ATRIAL FIBRILLATION) (HCC): ICD-10-CM

## 2022-03-08 DIAGNOSIS — Z45.09 ENCOUNTER FOR ELECTRONIC ANALYSIS OF REVEAL EVENT RECORDER: ICD-10-CM

## 2022-03-08 PROCEDURE — G2066 INTER DEVC REMOTE 30D: HCPCS | Performed by: INTERNAL MEDICINE

## 2022-03-08 PROCEDURE — 93298 REM INTERROG DEV EVAL SCRMS: CPT | Performed by: INTERNAL MEDICINE

## 2022-04-08 ENCOUNTER — NURSE ONLY (OUTPATIENT)
Dept: CARDIOLOGY CLINIC | Age: 72
End: 2022-04-08
Payer: MEDICARE

## 2022-04-08 DIAGNOSIS — I48.0 PAF (PAROXYSMAL ATRIAL FIBRILLATION) (HCC): ICD-10-CM

## 2022-04-08 DIAGNOSIS — Z45.09 ENCOUNTER FOR ELECTRONIC ANALYSIS OF REVEAL EVENT RECORDER: ICD-10-CM

## 2022-04-10 PROCEDURE — G2066 INTER DEVC REMOTE 30D: HCPCS | Performed by: INTERNAL MEDICINE

## 2022-04-10 PROCEDURE — 93298 REM INTERROG DEV EVAL SCRMS: CPT | Performed by: INTERNAL MEDICINE

## 2022-04-19 RX ORDER — WARFARIN SODIUM 5 MG/1
TABLET ORAL
Qty: 135 TABLET | Refills: 3 | OUTPATIENT
Start: 2022-04-19

## 2022-04-19 NOTE — TELEPHONE ENCOUNTER
Received refill request for Warfarin from [facility].     Last ov:04/29/2021 MXA    Last labs:04/01/2022  INR    Last Refill:01/24/2022 #135 tabs w/ 3 refills    Next appointment:05/26/2022 SHANTELL

## 2022-04-19 NOTE — TELEPHONE ENCOUNTER
Medication Refill    Medication needing refilled:  warfarin (COUMADIN)     Dosage of the medication:  5 MG tablet     How are you taking this medication (QD, BID, TID, QID, PRN):  Take 1 tablet Sunday, Tuesday, Thursday and 1 1/2 tablets all other days    30 or 90 day supply called in:  135 tablet    When will you run out of your medication:  Is out  Which Pharmacy are we sending the medication to?:  [address] [facility] [phone] - F [phone]   [address] [facility] [address]   Phone:  [phone]  Fax:  [phone]      Patient can be reached at [phone] if there are any questions.

## 2022-04-19 NOTE — TELEPHONE ENCOUNTER
Please see if PCP office is managing and if they will send Rx for patient.      Genesis Pemberton, APRN-CNP

## 2022-05-17 ENCOUNTER — NURSE ONLY (OUTPATIENT)
Dept: CARDIOLOGY CLINIC | Age: 72
End: 2022-05-17
Payer: MEDICARE

## 2022-05-17 DIAGNOSIS — I48.0 PAF (PAROXYSMAL ATRIAL FIBRILLATION) (HCC): ICD-10-CM

## 2022-05-17 DIAGNOSIS — Z45.09 ENCOUNTER FOR ELECTRONIC ANALYSIS OF REVEAL EVENT RECORDER: ICD-10-CM

## 2022-05-17 PROCEDURE — G2066 INTER DEVC REMOTE 30D: HCPCS | Performed by: INTERNAL MEDICINE

## 2022-05-17 PROCEDURE — 93298 REM INTERROG DEV EVAL SCRMS: CPT | Performed by: INTERNAL MEDICINE

## 2022-06-02 RX ORDER — WARFARIN SODIUM 5 MG/1
TABLET ORAL
Qty: 96 TABLET | Refills: 0 | Status: SHIPPED | OUTPATIENT
Start: 2022-06-02 | End: 2022-10-26

## 2022-06-02 NOTE — TELEPHONE ENCOUNTER
Received refill request for Warfarin 5mg from [facility] : 4/29/21 MXA    Last Labs : 5/16/22 Protime w/INR ([facility])    Last Refill : 1/24/22 135 w/3 refills    Next OV : 6/30/22 MXA

## 2022-06-21 ENCOUNTER — NURSE ONLY (OUTPATIENT)
Dept: CARDIOLOGY CLINIC | Age: 72
End: 2022-06-21
Payer: MEDICARE

## 2022-06-21 DIAGNOSIS — Z45.09 ENCOUNTER FOR ELECTRONIC ANALYSIS OF REVEAL EVENT RECORDER: ICD-10-CM

## 2022-06-21 DIAGNOSIS — I48.0 PAF (PAROXYSMAL ATRIAL FIBRILLATION) (HCC): ICD-10-CM

## 2022-06-21 PROCEDURE — 93298 REM INTERROG DEV EVAL SCRMS: CPT | Performed by: INTERNAL MEDICINE

## 2022-06-21 PROCEDURE — G2066 INTER DEVC REMOTE 30D: HCPCS | Performed by: INTERNAL MEDICINE

## 2022-06-21 NOTE — PROGRESS NOTES
We received a remote transmission from patient's monitor at home. Remote Linq report shows no arrhythmias. EP physician to review. We will continue to monitor remotely. End of 31-day monitoring period 6-21-22.

## 2022-06-29 PROBLEM — I42.8 NICM (NONISCHEMIC CARDIOMYOPATHY) (HCC): Status: ACTIVE | Noted: 2022-06-29

## 2022-06-29 NOTE — PROGRESS NOTES
[address]   Electrophysiology Follow up   Date: 6/30/2022  I had the privilege of visiting Bailey Dominguez in the office. CC: PAF  HPI: Bailey Dominguez is a 70 y.o. male He has a PMH of Afib, CHF, and MS. He had onset of irregular heart beats in 2010, but atrial fib was not diagnosed until later. He underwent cardioversion twice - January 15, 2013 and January 30, 2013. He had an ablation of atrial fibrillation with cryo balloon in March of 2012. With a hospitalization 12/2017 at [facility] with idiopathic transverse myelitis. He follows up with Dr. Davion Conrad for this. Other than complains of weakness and feeling of burning/tingling sensation in hands and feet he feels otherwise well. Interval History:  Michael Bergman presents to the office in follow up. He is doing well from a cardiac standpoint and has no complaints at this time. He has been continuing home monitoring for his ILR but will need a new home monitor since his battery is depleted. Patient denies lightheadedness, dizziness, chest pain, palpitations, orthopnea, edema, presyncope or syncope. Assessment and plan:   PAF   -ECG today shows Sinus rhythm    -<0.1% AT/AF burden per device interrogation today. ILR implanted 3/1/2019, the episodes do not appear to be real. Had one tachy episode lasting 5 seconds on 5/12/2021   -No recurrent episodes of atrial fibrillation documented since his ablation    -s/p afib ablation. 03/2012   -Patient has a [id number] Score of 2 (CMP, Age) remains on coumadin   -Only complaint with coumadin is bruising when he bumps into things, denies bleeding or excessive bruising without cause. -If he wants to stop his blood thinner we will exchange his loop when battery depleted and allow him to stop his coumadin. We did discuss that he remains at risk even with the monitoring but will let him decide.   -We also briefly discussed watchman if he has further issues with his blood thinner .   New Coumadin for now. NSVT   -one episode 5/12/2021 lasting 5 seconds.   -No recurrence since then    -Asymptomatic     Non-ischemic Cardiomyopathy:    -Echo 3/1/2022 showed EF of 52%   -Echo 03/03/2021 [facility] LVEF 50-55    -Follows with Dr. Marly Stockton previously now Rodrigo Parr at [facility]  Continue lisinopril. Plan:   1 year NPSR     Patient Active Problem List    Diagnosis Date Noted    NICM (nonischemic cardiomyopathy) ([address] 06/29/2022    Encounter for electronic analysis of reveal event recorder 01/03/2018    PAF (paroxysmal atrial fibrillation) ([address] 01/03/2018    Chronic fatigue 10/30/2019    Status post placement of implantable loop recorder      Diagnostic studies:   ECG 6/30/22  SR    Echo 3/1/2022  - Left ventricle: The cavity size is normal. Wall thickness is     normal. Systolic function was normal. The calculated ejection     fraction was 52%. Wall motion was normal; there were no regional     wall motion abnormalities. Doppler parameters are consistent with     abnormal left ventricular relaxation (grade 1 diastolic     dysfunction). The stroke volume is 87ml. The stroke index is     40ml/m^2. The global longitudinal strain was -12%. Ejection     fraction (EF) was calculated using 2d biplane Biggs&apos;s method. - Aortic valve: There was trivial regurgitation. The peak systolic     gradient is 6mm Hg.   - Mitral valve: The annulus is mildly calcified.   - Right ventricle: The cavity size is mildly dilated. Wall     thickness is normal.   - Atrial septum: Agitated saline contrast study shows no     right-to-left atrial level shunt.   - Tricuspid valve: There was mild-moderate regurgitation directed     eccentrically and toward the septum. - Inferior vena cava: The vessel was normal in size; the     respirophasic diameter changes were in the normal range (&gt;= 50%);     findings are consistent with normal central venous pressure. Echo 03/03/2021   Study Conclusions  - Left ventricle:  The cavity size is normal. Wall thickness was increased in a   pattern of mild LVH.    Systolic function was normal. The estimated ejection fraction was in the   range of 50% to 55%. Wall    motion was normal; there were no regional wall motion abnormalities. Features are consistent with    a pseudonormal left ventricular filling pattern, with concomitant abnormal   relaxation and    increased filling pressure (grade 2 diastolic dysfunction). - Mitral valve: Mild regurgitation.  - Left atrium: The atrium is mildly dilated. - Right ventricle: Systolic function was normal. TAPSE: 2.5cm. Tricuspid   annular systolic velocity:    03FO/E.  - Pulmonary arteries: Systolic pressure was within the normal range, estimated   to be 30mm Hg    assuming that the right atrial pressure was 3 mmHg. - Inferior vena cava: The vessel was normal in size. The respirophasic   diameter changes were in the    normal range (>= 50%). Impressions  - Prior study date 05/2017.  - Compared to the prior study, there has been no significant interval change.     ECHO 5/8/2017  - Left ventricle: The cavity size was normal. Wall thickness was      normal. Systolic function was normal. The estimated ejection      fraction was in the range of 55% to 60%. Wall motion was normal;      there were no regional wall motion abnormalities. The study is not      technically sufficient to allow evaluation of LV diastolic function.    - Mitral valve: Mild regurgitation.    - Right ventricle: Systolic function was normal by objective interpretation.    - Pulmonic valve: Peak gradient (S): 4mm Hg.      Echo 1/11/2013  - Study data: No prior study was available for comparison. - Left ventricle: The cavity size was mildly dilated. Wall thickness  was normal. Systolic function was at the lower limits of normal.  The estimated ejection fraction was 50%. Wall motion was normal;  there were no regional wall motion abnormalities.  The study is not  technically sufficient to allow evaluation of LV diastolic  function.  - Mitral valve: Mild regurgitation.  - Left atrium: The atrium was mildly dilated. - Right ventricle: Systolic function was normal by visual  assessment.  - Right atrium: The atrium was mildly dilated.      Stress 4/2019  FINAL INTERPRETATION  There is probably normal myocardial perfusion. However, there is mild left ventricular dysfunction and extensive areas of attenuation as described below. Overall study quality is fair.  Scan significance indicates low to moderate cardiac risk. There is evidence of diaphragmatic attenuation and sub-diaphragmatic interfering activity. PERFUSION FINDINGS  There is a medium to large sized area of moderately decreased perfusion in the basal inferior, basal inferoseptal, basal inferolateral, mid inferior, and apical inferior walls at rest with improvement with stress most consistent with extensive artifact. There is no evidence of visual transient dilation with a normal stress/rest left ventricular volume ratio of 1.07. The sum stress score is 0 (normal: less than 4, mildly abnormal: 4-8, severely abnormal: greater than 8). The right ventricle is mildly dilated. FUNCTION FINDINGS  The calculated left ventricular ejection fraction at rest is mildly reduced at 47% with an end diastolic volume of 129 mL and end systolic volume of 93 mL. MRI 2/12/2013  IMPRESSION:  1. The left ventricle is mildly dilated with moderate to severely reduced   systolic function. There is global mild to moderate hypokinesis with   severe hypokinesis involving the basal septal and inferior, mid septal and   inferior and apical inferior   segments. There is no evidence of myocardial edema by T2 weighted   imaging. There is no DGE. LV function consistent with significant cardiomyopathy, may be secondary   to tachycardia (atrial fibrillation). However, can not rule out ischemic   cardiomyopathy given regional hypokinesis.  Recommend ischemic evaluation. 2. The left atrium is mildly dilated. There are two right and two left   pulmonary veins without evidence of aneurysm or stenosis. The left   inferior pulmonary vein has an early branch. The left atrial appendage is   not adequately opacified to rule out   the presence of thrombus. There is moderate mitral regurgitation. 3. The right ventricle is normal in size with moderately reduced systolic   function. There is regional hypokinesis involving the basal inferior RV. 4. The right atrium is moderately dilated. There is mild to moderate   tricuspid regurgitation. 5. The aorta is at the upper limits of normal in size. 6. The pulmonary artery is normal in size. The coronary sinus is normal   in size. 7. There are small bilateral pleural effusions. Atelectasis is noted in   the posterior lung fields. 8. There is degenerative disc disease involving the thoracic vertebrae. 9. There is a 5 mm nodule of increased signal intensity visualized in the   liver. I independently reviewed the cardiac diagnostic studies, ECG and relevant imaging studies. No results found for: LVEF, LVEFMODE  No results found for: TSHFT4, TSH      Physical Examination:  Vitals:    06/30/22 1041   BP: 117/80   Pulse: 57   SpO2: 98%      Wt Readings from Last 3 Encounters:   06/30/22 210 lb 12.8 oz (95.6 kg)   04/29/21 220 lb 12.8 oz (100.2 kg)   01/13/21 213 lb (96.6 kg)       · Constitutional: Oriented. No distress. · Head: Normocephalic and atraumatic. · Mouth/Throat: Oropharynx is clear and moist.   · Eyes: Conjunctivae normal. EOM are normal.   · Neck: Neck supple. No rigidity. No JVD present. · Cardiovascular: Normal rate, regular rhythm, S1&S2. · Pulmonary/Chest: Bilateral respiratory sounds. No wheezes, No rhonchi. · Abdominal: Soft. Bowel sounds present. No distension, No tenderness. · Musculoskeletal: No tenderness. No edema    · Lymphadenopathy: Has no cervical adenopathy.    · Neurological: Alert and oriented. Cranial nerve appears intact, No Gross deficit   · Skin: Skin is warm and dry. No rash noted. · Psychiatric: Has a normal behavior       Review of System:  [x] Full ROS obtained and negative except as mentioned in HPI    Prior to Admission medications    Medication Sig Start Date End Date Taking? Authorizing Provider   warfarin (COUMADIN) 5 MG tablet TAKE 1 TABLET (5MG) ON TUES, THURS, SUN AND TAKE 1 AND 1/2 TABLETS (7.5MG) ON MON, WED, FRI, SAT, OR AS DIRECTED BY CLINIC 6/2/22  Yes Carlos Azul MD   lisinopril (PRINIVIL;ZESTRIL) 5 MG tablet Take 1 tablet by mouth daily 1/5/22  Yes JASMINA Frausto - CNP   ergocalciferol (ERGOCALCIFEROL) 88178 units capsule Take 50,000 Units by mouth daily 9/5/17  Yes Historical Provider, MD   oxybutynin (DITROPAN) 5 MG/5ML syrup Take 7.5 mg by mouth 2 times daily. 7.5ml at night 2.5ml in the  morning   Yes Historical Provider, MD   tamsulosin (FLOMAX) 0.4 MG capsule Take 0.4 mg by mouth daily. Yes Historical Provider, MD   warfarin (COUMADIN) 7.5 MG tablet Take 7.5 mg by mouth. Monday Wednesday and Friday   Yes Historical Provider, MD   Multiple Vitamins-Minerals (THERAPEUTIC MULTIVITAMIN-MINERALS) tablet Take 2 tablets by mouth daily. Yes Historical Provider, MD       No Known Allergies    Social History:  Reviewed. reports that he has never smoked. He has never used smokeless tobacco. He reports current alcohol use. He reports that he does not use drugs. Family History:  Reviewed. Reviewed. No family history of SCD. Relevant and available labs, and cardiovascular diagnostics reviewed. Reviewed. I independently reviewed relevant and available cardiac diagnostic tests ECG, CXR, Echo, Stress test, Device interrogation, Holter, CT scan. Outside medical records via Care everywhere reviewed and summarized in H&P above.     Complex medical condition with multiple medical problems affecting prognosis and outcome of EP interventions       - The patient is counseled to follow a low salt diet to assure blood pressure remains controlled for cardiovascular risk factor modification.   - The patient is counseled to avoid excess caffeine, and energy drinks as this may exacerbated ectopy and arrhythmia. - The patient is counseled to get regular exercise 3-5 times per week to control cardiovascular risk factors. All questions and concerns were addressed to the patient/family. Alternatives to my treatment were discussed. I have discussed the above stated plan and the patient verbalized understanding and agreed with the plan. Scribe attestation: This note was scribed in the presence of Fanta Frost MD by Cassius Monroe RN    I, Dr. Fanta Frost personally performed the services described in this documentation as scribed by RN in my presence, and it is both accurate and complete.        NOTE: This report was transcribed using voice recognition software. Every effort was made to ensure accuracy, however, inadvertent computerized transcription errors may be present.      Fanta Frost MD, [facility]   Office: [phone]  Fax: [phone]

## 2022-06-30 ENCOUNTER — NURSE ONLY (OUTPATIENT)
Dept: CARDIOLOGY CLINIC | Age: 72
End: 2022-06-30

## 2022-06-30 ENCOUNTER — OFFICE VISIT (OUTPATIENT)
Dept: CARDIOLOGY CLINIC | Age: 72
End: 2022-06-30
Payer: MEDICARE

## 2022-06-30 VITALS
DIASTOLIC BLOOD PRESSURE: 80 MMHG | SYSTOLIC BLOOD PRESSURE: 117 MMHG | HEIGHT: 72 IN | OXYGEN SATURATION: 98 % | WEIGHT: 210.8 LBS | HEART RATE: 57 BPM | BODY MASS INDEX: 28.55 KG/M2

## 2022-06-30 DIAGNOSIS — I48.0 PAF (PAROXYSMAL ATRIAL FIBRILLATION) (HCC): Primary | ICD-10-CM

## 2022-06-30 DIAGNOSIS — I47.29 NSVT (NONSUSTAINED VENTRICULAR TACHYCARDIA): ICD-10-CM

## 2022-06-30 DIAGNOSIS — I42.8 NICM (NONISCHEMIC CARDIOMYOPATHY) (HCC): ICD-10-CM

## 2022-06-30 DIAGNOSIS — Z45.09 ENCOUNTER FOR ELECTRONIC ANALYSIS OF REVEAL EVENT RECORDER: ICD-10-CM

## 2022-06-30 PROCEDURE — 1123F ACP DISCUSS/DSCN MKR DOCD: CPT | Performed by: INTERNAL MEDICINE

## 2022-06-30 PROCEDURE — 3017F COLORECTAL CA SCREEN DOC REV: CPT | Performed by: INTERNAL MEDICINE

## 2022-06-30 PROCEDURE — G8427 DOCREV CUR MEDS BY ELIG CLIN: HCPCS | Performed by: INTERNAL MEDICINE

## 2022-06-30 PROCEDURE — 1036F TOBACCO NON-USER: CPT | Performed by: INTERNAL MEDICINE

## 2022-06-30 PROCEDURE — 93000 ELECTROCARDIOGRAM COMPLETE: CPT | Performed by: INTERNAL MEDICINE

## 2022-06-30 PROCEDURE — 99214 OFFICE O/P EST MOD 30 MIN: CPT | Performed by: INTERNAL MEDICINE

## 2022-06-30 PROCEDURE — G8417 CALC BMI ABV UP PARAM F/U: HCPCS | Performed by: INTERNAL MEDICINE

## 2022-06-30 NOTE — PROGRESS NOTES
Patient comes in for interrogation of their implanted loop recorder. Interrogation shows Battery Status Good. 1 Tachy episode noted on 5/12/2021 lasting 5 seconds-NSVT. AF episodes appear to be SR with ectopy. Implanted for AF management. Patient remains on warfarin. Please see interrogation for more detail. Patient will see Dr. Curtis Chaudhary and we will continue to follow the Patient remotely.

## 2022-07-26 ENCOUNTER — TELEPHONE (OUTPATIENT)
Dept: CARDIOLOGY CLINIC | Age: 72
End: 2022-07-26

## 2022-07-26 ENCOUNTER — NURSE ONLY (OUTPATIENT)
Dept: CARDIOLOGY CLINIC | Age: 72
End: 2022-07-26
Payer: MEDICARE

## 2022-07-26 DIAGNOSIS — I48.0 PAF (PAROXYSMAL ATRIAL FIBRILLATION) (HCC): Primary | ICD-10-CM

## 2022-07-26 DIAGNOSIS — Z45.09 ENCOUNTER FOR ELECTRONIC ANALYSIS OF REVEAL EVENT RECORDER: ICD-10-CM

## 2022-07-26 PROCEDURE — 93298 REM INTERROG DEV EVAL SCRMS: CPT | Performed by: INTERNAL MEDICINE

## 2022-07-26 PROCEDURE — G2066 INTER DEVC REMOTE 30D: HCPCS | Performed by: INTERNAL MEDICINE

## 2022-07-26 NOTE — TELEPHONE ENCOUNTER
Prior note states patient did not want to replace it. Please call and discuss with patient. If he does ot want to replace it, it can be removed or left in place.

## 2022-07-26 NOTE — PROGRESS NOTES
We received a remote transmission from patient's monitor at home. Remote Linq report shows no arrhythmias. Pt has reached the PATO. Office staff notified. EP physician to review. We will continue to monitor remotely.     End of 31-day monitoring period 7-26-22

## 2022-08-15 RX ORDER — LISINOPRIL 5 MG/1
TABLET ORAL
Qty: 90 TABLET | Refills: 3 | Status: SHIPPED | OUTPATIENT
Start: 2022-08-15

## 2022-08-15 NOTE — TELEPHONE ENCOUNTER
Received refill request for Lisinopril from [facility] : 6/30/22 MXA    Last Labs : 2/1/22 Care everywhere     Last Refill : 1/5/22     Next OV : Recall listed 6/30/23 MXA

## 2022-09-08 ENCOUNTER — TELEPHONE (OUTPATIENT)
Dept: CARDIOLOGY CLINIC | Age: 72
End: 2022-09-08

## 2022-09-08 NOTE — TELEPHONE ENCOUNTER
Marlen Gusman and discussed options. He would like to replace his loop recorder and is considering staying on coumadin. He will discuss this further with Dr. Guerita Gómez. Schedul ing letter sent.

## 2022-09-08 NOTE — TELEPHONE ENCOUNTER
Patient is returning Mrs Coon's call recording Device. Previous ADELA Pace    10:10 AM  Note   Prior note states patient did not want to replace it. Please call and discuss with patient. If he does ot want to replace it, it can be removed or left in place.        ADELA Post    4:31 PM  Note   Call placed to Kemi Rosales to discuss whether he has decided regarding his AC/monitor

## 2022-09-08 NOTE — LETTER
[address]  EP Procedure Sheet    9/8/22  Brigido Cadena  1950  EP Procedures  [] Pacemaker implant (single/dual) [] EP Study   [] ICD implant (single/dual) [] Atrial flutter ablation (ALTA Y/N)   [] Biv implant ICD [] Tilt Table   [] Biv implant PPM [] Atrial fibrillation ablation (ALTA Yes)   [] Generator Change (PPM/ICD/BiV) [] SVT ablation   [] Lead revision (RV/LA/RA) (<1 month) [] PVC ablation     [] Lead extraction +/- upgrade (BiV/PPM/ICD) [] VT Ischemic/ non-ischemic   [x] Loop remove and replace  [] VT RVOT   [] Cardioversion [] VT Left sided   [] ALTA [] AVN ablation   Equipment  [] [email or web address]  [] [employer]   [] [employer] [employer] [] [address]   [] [employer] [] CryoAblation   [] [employer] [] Laser Lead Extraction   EP Procedures Scheduling Request  # hours Requested  1 []2 []2-4 [] 4-6 Scheduled  Date:   Specific Day  Completed    Anesthesia []yes []no F/u Date:   CT surgery backup []yes []no COVID     Overnight stay      Performing MD  []RMM [x]MXA   []MKW [] Other _______ First vs repeat   []1st [] 2nd [] 3rd   Pre-Procedure Labs / Imaging  [] PT/INR [] Type & cross   [] CBC [] Units PRBC   [] BMP/Mg [] Units FFP   [] Venogram [] Cardiac CTA for Pulmonary vein mapping     RN INITIALS: DW     Patient Instructions  Do not eat or drink after midnight the night prior to procedure  Dx: PAF, loop battery depleted   ICD-10 code: i48.0 , Z45.09

## 2022-09-22 ENCOUNTER — TELEPHONE (OUTPATIENT)
Dept: CARDIOLOGY CLINIC | Age: 72
End: 2022-09-22

## 2022-09-29 NOTE — TELEPHONE ENCOUNTER
Called Brenda Gomez to get the Loop Removal/Replacment scheduled and he advised that he & his wife are thinking about the Watchman procedure. He is wanting to get off the Coumadin and he would like to discuss the Watchman with MXA to decide if he would rather go this route or schedule the Loop Rem/Rep. Please call to discuss. Thank you.

## 2022-10-26 RX ORDER — WARFARIN SODIUM 5 MG/1
TABLET ORAL
Qty: 96 TABLET | Refills: 0 | Status: SHIPPED | OUTPATIENT
Start: 2022-10-26

## 2022-11-02 NOTE — PROGRESS NOTES
[address]   Electrophysiology Follow up   Date: 11/2/2022  I had the privilege of visiting Summer Stevens in the office. CC: PAF  HPI: Summer Stevens is a 70 y.o. male He has a PMH of Afib, CHF, and MS. He had onset of irregular heart beats in 2010, but atrial fib was not diagnosed until later. He underwent cardioversion twice - January 15, 2013 and January 30, 2013. He had an ablation of atrial fibrillation with cryo balloon in March of 2012. With a hospitalization 12/2017 at [facility] with idiopathic transverse myelitis. He follows up with Dr. Yessy Dickinson for this. Other than complains of weakness and feeling of burning/tingling sensation in hands and feet he feels otherwise well. Interval History:  Christian Edwards presents to the office in follow up. Assessment and plan:   PAF   -ECG today shows ***   -***% AT/AF burden per device interrogation today. ILR implanted 3/1/2019, the episodes do not appear to be real. Had one tachy episode lasting 5 seconds on 5/12/2021   -No recurrent episodes of atrial fibrillation documented since his ablation ***   -s/p afib ablation. 03/2012   -Patient has a [id number] Score of 2 (CMP, Age) remains on coumadin   -Only complaint with coumadin is bruising when he bumps into things, denies bleeding or excessive bruising without cause. -If he wants to stop his blood thinner we will exchange his loop when battery depleted and allow him to stop his coumadin. We did discuss that he remains at risk even with the monitoring but will let him decide. NSVT   -one episode 5/12/2021 lasting 5 seconds.   -No recurrence since then    -Asymptomatic     Non-ischemic Cardiomyopathy:    -Echo 3/1/2022 showed EF of 52%   -Echo 03/03/2021 [facility] LVEF 50-55    -Follows with Dr. Kary Medina previously now Quan Swann at [facility]  -Continue lisinopril.     Plan:     Patient Active Problem List    Diagnosis Date Noted    NICM (nonischemic cardiomyopathy) ([facility] [address] 06/29/2022 Chronic fatigue 10/30/2019    Status post placement of implantable loop recorder     Encounter for electronic analysis of reveal event recorder 01/03/2018    PAF (paroxysmal atrial fibrillation) ([facility] [address] 01/03/2018     Diagnostic studies:   ECG 11/2/22  ***SR/AFIB  *** QTcH, ***QRS    Echo 3/1/2022  - Left ventricle: The cavity size is normal. Wall thickness is     normal. Systolic function was normal. The calculated ejection     fraction was 52%. Wall motion was normal; there were no regional     wall motion abnormalities. Doppler parameters are consistent with     abnormal left ventricular relaxation (grade 1 diastolic     dysfunction). The stroke volume is 87ml. The stroke index is     40ml/m^2. The global longitudinal strain was -12%. Ejection     fraction (EF) was calculated using 2d biplane Biggs&apos;s method. - Aortic valve: There was trivial regurgitation. The peak systolic     gradient is 6mm Hg.   - Mitral valve: The annulus is mildly calcified.   - Right ventricle: The cavity size is mildly dilated. Wall     thickness is normal.   - Atrial septum: Agitated saline contrast study shows no     right-to-left atrial level shunt.   - Tricuspid valve: There was mild-moderate regurgitation directed     eccentrically and toward the septum. - Inferior vena cava: The vessel was normal in size; the     respirophasic diameter changes were in the normal range (&gt;= 50%);     findings are consistent with normal central venous pressure. Echo 03/03/2021   Study Conclusions  - Left ventricle: The cavity size is normal. Wall thickness was increased in a   pattern of mild LVH. Systolic function was normal. The estimated ejection fraction was in the   range of 50% to 55%. Wall    motion was normal; there were no regional wall motion abnormalities.    Features are consistent with    a pseudonormal left ventricular filling pattern, with concomitant abnormal   relaxation and    increased filling pressure (grade 2 diastolic dysfunction). - Mitral valve: Mild regurgitation.  - Left atrium: The atrium is mildly dilated. - Right ventricle: Systolic function was normal. TAPSE: 2.5cm. Tricuspid   annular systolic velocity:    05QH/H.  - Pulmonary arteries: Systolic pressure was within the normal range, estimated   to be 30mm Hg    assuming that the right atrial pressure was 3 mmHg. - Inferior vena cava: The vessel was normal in size. The respirophasic   diameter changes were in the    normal range (>= 50%). Impressions  - Prior study date 05/2017.  - Compared to the prior study, there has been no significant interval change. ECHO 5/8/2017  - Left ventricle: The cavity size was normal. Wall thickness was      normal. Systolic function was normal. The estimated ejection      fraction was in the range of 55% to 60%. Wall motion was normal;      there were no regional wall motion abnormalities. The study is not      technically sufficient to allow evaluation of LV diastolic function.    - Mitral valve: Mild regurgitation.    - Right ventricle: Systolic function was normal by objective interpretation.    - Pulmonic valve: Peak gradient (S): 4mm Hg. Echo 1/11/2013  - Study data: No prior study was available for comparison. - Left ventricle: The cavity size was mildly dilated. Wall thickness  was normal. Systolic function was at the lower limits of normal.  The estimated ejection fraction was 50%. Wall motion was normal;  there were no regional wall motion abnormalities. The study is not  technically sufficient to allow evaluation of LV diastolic  function.  - Mitral valve: Mild regurgitation.  - Left atrium: The atrium was mildly dilated. - Right ventricle: Systolic function was normal by visual  assessment.  - Right atrium: The atrium was mildly dilated. Stress 4/2019  FINAL INTERPRETATION  There is probably normal myocardial perfusion.  However, there is mild left ventricular dysfunction and extensive areas of attenuation as described below. Overall study quality is fair. Scan significance indicates low to moderate cardiac risk. There is evidence of diaphragmatic attenuation and sub-diaphragmatic interfering activity. PERFUSION FINDINGS  There is a medium to large sized area of moderately decreased perfusion in the basal inferior, basal inferoseptal, basal inferolateral, mid inferior, and apical inferior walls at rest with improvement with stress most consistent with extensive artifact. There is no evidence of visual transient dilation with a normal stress/rest left ventricular volume ratio of 1.07. The sum stress score is 0 (normal: less than 4, mildly abnormal: 4-8, severely abnormal: greater than 8). The right ventricle is mildly dilated. FUNCTION FINDINGS  The calculated left ventricular ejection fraction at rest is mildly reduced at 47% with an end diastolic volume of 140 mL and end systolic volume of 93 mL. MRI 2/12/2013  IMPRESSION:  1. The left ventricle is mildly dilated with moderate to severely reduced   systolic function. There is global mild to moderate hypokinesis with   severe hypokinesis involving the basal septal and inferior, mid septal and   inferior and apical inferior   segments. There is no evidence of myocardial edema by T2 weighted   imaging. There is no DGE. LV function consistent with significant cardiomyopathy, may be secondary   to tachycardia (atrial fibrillation). However, can not rule out ischemic   cardiomyopathy given regional hypokinesis. Recommend ischemic evaluation. 2. The left atrium is mildly dilated. There are two right and two left   pulmonary veins without evidence of aneurysm or stenosis. The left   inferior pulmonary vein has an early branch. The left atrial appendage is   not adequately opacified to rule out   the presence of thrombus. There is moderate mitral regurgitation. 3. The right ventricle is normal in size with moderately reduced systolic   function. There is regional hypokinesis involving the basal inferior RV. 4. The right atrium is moderately dilated. There is mild to moderate   tricuspid regurgitation. 5. The aorta is at the upper limits of normal in size. 6. The pulmonary artery is normal in size. The coronary sinus is normal   in size. 7. There are small bilateral pleural effusions. Atelectasis is noted in   the posterior lung fields. 8. There is degenerative disc disease involving the thoracic vertebrae. 9. There is a 5 mm nodule of increased signal intensity visualized in the   liver. I independently reviewed the cardiac diagnostic studies, ECG and relevant imaging studies. No results found for: LVEF, LVEFMODE  No results found for: TSHFT4, TSH      Physical Examination:  There were no vitals filed for this visit. Wt Readings from Last 3 Encounters:   06/30/22 210 lb 12.8 oz (95.6 kg)   04/29/21 220 lb 12.8 oz (100.2 kg)   01/13/21 213 lb (96.6 kg)       Constitutional: Oriented. No distress. Head: Normocephalic and atraumatic. Mouth/Throat: Oropharynx is clear and moist.   Eyes: Conjunctivae normal. EOM are normal.   Neck: Neck supple. No rigidity. No JVD present. Cardiovascular: Normal rate, regular rhythm, S1&S2. Pulmonary/Chest: Bilateral respiratory sounds. No wheezes, No rhonchi. Abdominal: Soft. Bowel sounds present. No distension, No tenderness. Musculoskeletal: No tenderness. No edema    Lymphadenopathy: Has no cervical adenopathy. Neurological: Alert and oriented. Cranial nerve appears intact, No Gross deficit   Skin: Skin is warm and dry. No rash noted. Psychiatric: Has a normal behavior       Review of System:  [x] Full ROS obtained and negative except as mentioned in HPI    Prior to Admission medications    Medication Sig Start Date End Date Taking?  Authorizing Provider   warfarin (COUMADIN) 5 MG tablet TAKE 1 TABLET (5MG) ON TUES, THURS, SUN AND TAKE 1 AND 1/2 TABLETS (7.5MG) ON MON, WED, FRI, SAT, OR AS DIRECTED BY CLINIC 10/26/22   Hakan Sanders MD   lisinopril (PRINIVIL;ZESTRIL) 5 MG tablet TAKE 1 TABLET EVERY DAY 8/15/22   Morena , JASMINA - CNP   ergocalciferol (ERGOCALCIFEROL) 19041 units capsule Take 50,000 Units by mouth daily 9/5/17   Historical Provider, MD   oxybutynin (DITROPAN) 5 MG/5ML syrup Take 7.5 mg by mouth 2 times daily. 7.5ml at night 2.5ml in the  morning    Historical Provider, MD   tamsulosin (FLOMAX) 0.4 MG capsule Take 0.4 mg by mouth daily. Historical Provider, MD   warfarin (COUMADIN) 7.5 MG tablet Take 7.5 mg by mouth. Monday Wednesday and Friday    Historical Provider, MD   Multiple Vitamins-Minerals (THERAPEUTIC MULTIVITAMIN-MINERALS) tablet Take 2 tablets by mouth daily. Historical Provider, MD       No Known Allergies    Social History:  Reviewed. reports that he has never smoked. He has never used smokeless tobacco. He reports current alcohol use. He reports that he does not use drugs. Family History:  Reviewed. Reviewed. No family history of SCD. Relevant and available labs, and cardiovascular diagnostics reviewed. Reviewed. I independently reviewed relevant and available cardiac diagnostic tests ECG, CXR, Echo, Stress test, Device interrogation, Holter, CT scan. Outside medical records via Care everywhere reviewed and summarized in H&P above. Complex medical condition with multiple medical problems affecting prognosis and outcome of EP interventions       - The patient is counseled to follow a low salt diet to assure blood pressure remains controlled for cardiovascular risk factor modification.   - The patient is counseled to avoid excess caffeine, and energy drinks as this may exacerbated ectopy and arrhythmia. - The patient is counseled to get regular exercise 3-5 times per week to control cardiovascular risk factors. All questions and concerns were addressed to the patient/family. Alternatives to my treatment were discussed.  I have discussed the above stated plan and the patient verbalized understanding and agreed with the plan. ***    NOTE: This report was transcribed using voice recognition software. Every effort was made to ensure accuracy, however, inadvertent computerized transcription errors may be present.      Christal Lesches MD, [address] [facility]   Office: [phone]  Fax: [phone]

## 2022-11-07 NOTE — TELEPHONE ENCOUNTER
Patient called back about the message below ans stated that [facility] will be filling his INR and he is not sure why we got it.

## 2022-11-17 ENCOUNTER — NURSE ONLY (OUTPATIENT)
Dept: CARDIOLOGY CLINIC | Age: 72
End: 2022-11-17

## 2022-11-17 ENCOUNTER — OFFICE VISIT (OUTPATIENT)
Dept: CARDIOLOGY CLINIC | Age: 72
End: 2022-11-17
Payer: MEDICARE

## 2022-11-17 VITALS
OXYGEN SATURATION: 97 % | HEIGHT: 72 IN | HEART RATE: 59 BPM | BODY MASS INDEX: 28.73 KG/M2 | WEIGHT: 212.08 LBS | DIASTOLIC BLOOD PRESSURE: 80 MMHG | SYSTOLIC BLOOD PRESSURE: 122 MMHG

## 2022-11-17 DIAGNOSIS — I47.29 NSVT (NONSUSTAINED VENTRICULAR TACHYCARDIA): ICD-10-CM

## 2022-11-17 DIAGNOSIS — I48.0 PAF (PAROXYSMAL ATRIAL FIBRILLATION) (HCC): Primary | ICD-10-CM

## 2022-11-17 DIAGNOSIS — Z45.09 ENCOUNTER FOR ELECTRONIC ANALYSIS OF REVEAL EVENT RECORDER: ICD-10-CM

## 2022-11-17 DIAGNOSIS — I42.8 NICM (NONISCHEMIC CARDIOMYOPATHY) (HCC): ICD-10-CM

## 2022-11-17 PROCEDURE — 1123F ACP DISCUSS/DSCN MKR DOCD: CPT | Performed by: INTERNAL MEDICINE

## 2022-11-17 PROCEDURE — 1036F TOBACCO NON-USER: CPT | Performed by: INTERNAL MEDICINE

## 2022-11-17 PROCEDURE — 99214 OFFICE O/P EST MOD 30 MIN: CPT | Performed by: INTERNAL MEDICINE

## 2022-11-17 PROCEDURE — 3017F COLORECTAL CA SCREEN DOC REV: CPT | Performed by: INTERNAL MEDICINE

## 2022-11-17 PROCEDURE — G8484 FLU IMMUNIZE NO ADMIN: HCPCS | Performed by: INTERNAL MEDICINE

## 2022-11-17 PROCEDURE — 93000 ELECTROCARDIOGRAM COMPLETE: CPT | Performed by: INTERNAL MEDICINE

## 2022-11-17 PROCEDURE — G8427 DOCREV CUR MEDS BY ELIG CLIN: HCPCS | Performed by: INTERNAL MEDICINE

## 2022-11-17 PROCEDURE — G8417 CALC BMI ABV UP PARAM F/U: HCPCS | Performed by: INTERNAL MEDICINE

## 2022-11-17 NOTE — PATIENT INSTRUCTIONS
-If you decide to change from coumadin to Eliquis call our office  -Obtain a smart watch to monitor your heart rate (apple, [employer], [employer])

## 2022-11-17 NOTE — PROGRESS NOTES
decides to change to Eliquis, we will stop his coumadin for 3-4 days before starting it   -Will monitor his HR and rhythm with a smart watch/wearable device    NSVT   -one episode 5/12/2021 lasting 5 seconds.   -No recurrence since then    -Asymptomatic     Non-ischemic Cardiomyopathy:    -Echo 3/1/2022 showed EF of 52%   -Echo 03/03/2021 [facility] LVEF 50-55    -Follows with Dr. Bonnie Rodriguez previously now Minetta Begun at [facility]  -Continue lisinopril 5 mg daily    Plan:   Monitor HR and rhythm with smart watch  Follow up 1 year EPNP  If he decides to change to Eliquis or Xarelto, we will stop his coumadin for 3-4 days before starting it    Patient Active Problem List    Diagnosis Date Noted    NSVT (nonsustained ventricular tachycardia) 11/17/2022    NICM (nonischemic cardiomyopathy) ([facility] [address] 06/29/2022    Encounter for electronic analysis of reveal event recorder 01/03/2018    PAF (paroxysmal atrial fibrillation) ([facility] [address] 01/03/2018    Chronic fatigue 10/30/2019    Status post placement of implantable loop recorder      Diagnostic studies:   ECG 11/17/22  Sinus Bradycardia   425 QTcH, 124QRS    Echo 3/1/2022  - Left ventricle: The cavity size is normal. Wall thickness is     normal. Systolic function was normal. The calculated ejection     fraction was 52%. Wall motion was normal; there were no regional     wall motion abnormalities. Doppler parameters are consistent with     abnormal left ventricular relaxation (grade 1 diastolic     dysfunction). The stroke volume is 87ml. The stroke index is     40ml/m^2. The global longitudinal strain was -12%. Ejection     fraction (EF) was calculated using 2d biplane Biggs&apos;s method. - Aortic valve: There was trivial regurgitation. The peak systolic     gradient is 6mm Hg.   - Mitral valve: The annulus is mildly calcified.   - Right ventricle: The cavity size is mildly dilated.  Wall     thickness is normal.   - Atrial septum: Agitated saline contrast study shows no     right-to-left atrial level shunt.   - Tricuspid valve: There was mild-moderate regurgitation directed     eccentrically and toward the septum. - Inferior vena cava: The vessel was normal in size; the     respirophasic diameter changes were in the normal range (&gt;= 50%);     findings are consistent with normal central venous pressure. Echo 03/03/2021   Study Conclusions  - Left ventricle: The cavity size is normal. Wall thickness was increased in a   pattern of mild LVH. Systolic function was normal. The estimated ejection fraction was in the   range of 50% to 55%. Wall    motion was normal; there were no regional wall motion abnormalities. Features are consistent with    a pseudonormal left ventricular filling pattern, with concomitant abnormal   relaxation and    increased filling pressure (grade 2 diastolic dysfunction). - Mitral valve: Mild regurgitation.  - Left atrium: The atrium is mildly dilated. - Right ventricle: Systolic function was normal. TAPSE: 2.5cm. Tricuspid   annular systolic velocity:    88UM/A.  - Pulmonary arteries: Systolic pressure was within the normal range, estimated   to be 30mm Hg    assuming that the right atrial pressure was 3 mmHg. - Inferior vena cava: The vessel was normal in size. The respirophasic   diameter changes were in the    normal range (>= 50%). Impressions  - Prior study date 05/2017.  - Compared to the prior study, there has been no significant interval change. ECHO 5/8/2017  - Left ventricle: The cavity size was normal. Wall thickness was      normal. Systolic function was normal. The estimated ejection      fraction was in the range of 55% to 60%. Wall motion was normal;      there were no regional wall motion abnormalities.  The study is not      technically sufficient to allow evaluation of LV diastolic function.    - Mitral valve: Mild regurgitation.    - Right ventricle: Systolic function was normal by objective interpretation.    - Pulmonic valve: Peak gradient (S): 4mm Hg. Echo 1/11/2013  - Study data: No prior study was available for comparison. - Left ventricle: The cavity size was mildly dilated. Wall thickness  was normal. Systolic function was at the lower limits of normal.  The estimated ejection fraction was 50%. Wall motion was normal;  there were no regional wall motion abnormalities. The study is not  technically sufficient to allow evaluation of LV diastolic  function.  - Mitral valve: Mild regurgitation.  - Left atrium: The atrium was mildly dilated. - Right ventricle: Systolic function was normal by visual  assessment.  - Right atrium: The atrium was mildly dilated. Stress 4/2019  FINAL INTERPRETATION  There is probably normal myocardial perfusion. However, there is mild left ventricular dysfunction and extensive areas of attenuation as described below. Overall study quality is fair. Scan significance indicates low to moderate cardiac risk. There is evidence of diaphragmatic attenuation and sub-diaphragmatic interfering activity. PERFUSION FINDINGS  There is a medium to large sized area of moderately decreased perfusion in the basal inferior, basal inferoseptal, basal inferolateral, mid inferior, and apical inferior walls at rest with improvement with stress most consistent with extensive artifact. There is no evidence of visual transient dilation with a normal stress/rest left ventricular volume ratio of 1.07. The sum stress score is 0 (normal: less than 4, mildly abnormal: 4-8, severely abnormal: greater than 8). The right ventricle is mildly dilated. FUNCTION FINDINGS  The calculated left ventricular ejection fraction at rest is mildly reduced at 47% with an end diastolic volume of 749 mL and end systolic volume of 93 mL. MRI 2/12/2013  IMPRESSION:  1. The left ventricle is mildly dilated with moderate to severely reduced   systolic function.  There is global mild to moderate hypokinesis with   severe hypokinesis involving the basal septal and inferior, mid septal and   inferior and apical inferior   segments. There is no evidence of myocardial edema by T2 weighted   imaging. There is no DGE. LV function consistent with significant cardiomyopathy, may be secondary   to tachycardia (atrial fibrillation). However, can not rule out ischemic   cardiomyopathy given regional hypokinesis. Recommend ischemic evaluation. 2. The left atrium is mildly dilated. There are two right and two left   pulmonary veins without evidence of aneurysm or stenosis. The left   inferior pulmonary vein has an early branch. The left atrial appendage is   not adequately opacified to rule out   the presence of thrombus. There is moderate mitral regurgitation. 3. The right ventricle is normal in size with moderately reduced systolic   function. There is regional hypokinesis involving the basal inferior RV. 4. The right atrium is moderately dilated. There is mild to moderate   tricuspid regurgitation. 5. The aorta is at the upper limits of normal in size. 6. The pulmonary artery is normal in size. The coronary sinus is normal   in size. 7. There are small bilateral pleural effusions. Atelectasis is noted in   the posterior lung fields. 8. There is degenerative disc disease involving the thoracic vertebrae. 9. There is a 5 mm nodule of increased signal intensity visualized in the   liver. I independently reviewed the cardiac diagnostic studies, ECG and relevant imaging studies. No results found for: LVEF, LVEFMODE  No results found for: TSHFT4, TSH      Physical Examination:  Vitals:    11/17/22 1515   BP: 122/80   Pulse: 59   SpO2: 97%        Wt Readings from Last 3 Encounters:   11/17/22 212 lb 1.3 oz (96.2 kg)   06/30/22 210 lb 12.8 oz (95.6 kg)   04/29/21 220 lb 12.8 oz (100.2 kg)       Constitutional: Oriented. No distress. Head: Normocephalic and atraumatic.    Mouth/Throat: Oropharynx is clear and moist.   Eyes: Conjunctivae normal. EOM are normal.   Neck: Neck supple. No rigidity. No JVD present. Cardiovascular: Normal rate, regular rhythm, S1&S2. Pulmonary/Chest: Bilateral respiratory sounds. No wheezes, No rhonchi. Abdominal: Soft. Bowel sounds present. No distension, No tenderness. Musculoskeletal: No tenderness. No edema    Lymphadenopathy: Has no cervical adenopathy. Neurological: Alert and oriented. Cranial nerve appears intact, No Gross deficit   Skin: Skin is warm and dry. No rash noted. Psychiatric: Has a normal behavior       Review of System:  [x] Full ROS obtained and negative except as mentioned in HPI    Prior to Admission medications    Medication Sig Start Date End Date Taking? Authorizing Provider   warfarin (COUMADIN) 5 MG tablet TAKE 1 TABLET (5MG) ON TUES, THURS, SUN AND TAKE 1 AND 1/2 TABLETS (7.5MG) ON MON, WED, FRI, SAT, OR AS DIRECTED BY CLINIC 10/26/22  Yes Alfie Tyler MD   lisinopril (PRINIVIL;ZESTRIL) 5 MG tablet TAKE 1 TABLET EVERY DAY 8/15/22  Yes JASMINA Burrell - CNP   ergocalciferol (ERGOCALCIFEROL) 97380 units capsule Take 50,000 Units by mouth daily 9/5/17  Yes Historical Provider, MD   oxybutynin (DITROPAN) 5 MG/5ML syrup Take 7.5 mg by mouth 2 times daily. 7.5ml at night 2.5ml in the  morning   Yes Historical Provider, MD   tamsulosin (FLOMAX) 0.4 MG capsule Take 0.4 mg by mouth daily. Yes Historical Provider, MD   warfarin (COUMADIN) 7.5 MG tablet Take 7.5 mg by mouth. Monday Wednesday and Friday   Yes Historical Provider, MD   Multiple Vitamins-Minerals (THERAPEUTIC MULTIVITAMIN-MINERALS) tablet Take 2 tablets by mouth daily. Yes Historical Provider, MD       No Known Allergies    Social History:  Reviewed. reports that he has never smoked. He has never used smokeless tobacco. He reports current alcohol use. He reports that he does not use drugs. Family History:  Reviewed. Reviewed. No family history of SCD.       Relevant and available labs, and cardiovascular diagnostics reviewed. Reviewed. I independently reviewed relevant and available cardiac diagnostic tests ECG, CXR, Echo, Stress test, Device interrogation, Holter, CT scan. Outside medical records via Care everywhere reviewed and summarized in H&P above. Complex medical condition with multiple medical problems affecting prognosis and outcome of EP interventions       - The patient is counseled to follow a low salt diet to assure blood pressure remains controlled for cardiovascular risk factor modification.   - The patient is counseled to avoid excess caffeine, and energy drinks as this may exacerbated ectopy and arrhythmia. - The patient is counseled to get regular exercise 3-5 times per week to control cardiovascular risk factors. All questions and concerns were addressed to the patient/family. Alternatives to my treatment were discussed. I have discussed the above stated plan and the patient verbalized understanding and agreed with the plan. Scribe attestation: This note was scribed in the presence of Debbie Michelle MD by Bibiana Louise RN    I, Dr. Debbie Michelle personally performed the services described in this documentation as scribed by RN in my presence, and it is both accurate and complete. NOTE: This report was transcribed using voice recognition software. Every effort was made to ensure accuracy, however, inadvertent computerized transcription errors may be present.      Debbie Michelle MD, [place] [address] [facility]   Office: [phone]  Fax: [phone]

## 2023-01-13 ENCOUNTER — TELEPHONE (OUTPATIENT)
Dept: CARDIOLOGY CLINIC | Age: 73
End: 2023-01-13

## 2023-01-13 NOTE — TELEPHONE ENCOUNTER
Medication Refill    Medication needing refilled:warfarin (COUMADIN      Dosage of the medication:5 MG tablet      How are you taking this medication (QD, BID, TID, QID, PRN):TAKE 1 TABLET (5MG) ON TUES, THURS, SUN AND TAKE 1 AND 1/2 TABLETS (7.5MG) ON MON, WED, FRI, SAT, OR AS DIRECTED BY CLINIC    30 or 90 day supply called in:96 tablet    When will you run out of your medication:  Currently out    Which Pharm[facility] are we sending the medication to?:  [facility] [phone] [facility] F [phone]   [address] [place] [address]. [address]   Phone:  [phone]  Fax:  [phone]

## 2023-01-19 ENCOUNTER — TELEPHONE (OUTPATIENT)
Dept: CARDIOLOGY CLINIC | Age: 73
End: 2023-01-19

## 2023-01-19 NOTE — TELEPHONE ENCOUNTER
Medication Refill    Medication needing refilled:  warfarin (COUMADIN)     Dosage of the medication: 5mg    How are you taking this medication (QD, BID, TID, QID, PRN):  TAKE 1 TABLET (5MG) ON TUES, THURS, SUN AND TAKE 1 AND 1/2 TABLETS (7.5MG) ON MON, WED, FRI, SAT, OR AS DIRECTED BY CLINIC    30 or 90 day supply called in: 120    When will you run out of your medication:    Which Pharm[facility] are we sending the medication to?:    [facility] [phone] - F [phone]

## 2023-03-08 ENCOUNTER — TELEPHONE (OUTPATIENT)
Dept: CARDIOLOGY CLINIC | Age: 73
End: 2023-03-08

## 2023-03-08 NOTE — TELEPHONE ENCOUNTER
Keesha Bison called to request  for the Pt a standing PT INR order. Please fax to:  [phone].   Please advise

## 2023-03-09 DIAGNOSIS — Z79.01 ANTICOAGULATED ON WARFARIN: Primary | ICD-10-CM

## 2023-06-23 ENCOUNTER — TELEPHONE (OUTPATIENT)
Dept: CARDIOLOGY CLINIC | Age: 73
End: 2023-06-23

## 2023-06-23 NOTE — TELEPHONE ENCOUNTER
Medication Refill    Medication needing refilled:  Warfarin - PT only has enough to cover him until Tuesday. Asking if script could be sent today.       Dosage of the medication: 5 mg     How are you taking this medication (QD, BID, TID, QID, PRN):  1 TABLET (5MG) ON TUES, THURS, SUN AND TAKE 1 AND 1/2 TABLETS (7.5MG) ON MON, WED, FRI, SAT, OR AS DIRECTED BY CLINIC    30 or 90 day supply called in: 90 day supply    When will you run out of your medication:    Which Pharm[facility] are we sending the medication to?: [facility] [phone] - F [phone]

## 2023-06-30 RX ORDER — WARFARIN SODIUM 5 MG/1
TABLET ORAL
Qty: 96 TABLET | Refills: 1 | Status: SHIPPED | OUTPATIENT
Start: 2023-06-30

## 2023-10-23 RX ORDER — LISINOPRIL 5 MG/1
TABLET ORAL
Qty: 90 TABLET | Refills: 10 | Status: SHIPPED | OUTPATIENT
Start: 2023-10-23

## 2023-10-23 NOTE — TELEPHONE ENCOUNTER
Received refill request for lisinopril (PRINIVIL;ZESTRIL) 5 MG tablet from [address] [place] [address].     Last ov:2022 M[facility]        Last EK2023 [facility]    Last Refill:8/15/2022    Next appointment:2023 SHANTELL

## 2024-01-03 NOTE — PROGRESS NOTES
[facility]   Electrophysiology Follow up   Date: 1/4/2024  I had the privilege of visiting Martin Israel in the office.     CC: PAF  HPI: Martin Israel is a 73 y.o. male He has a PMH of Afib, CHF, and MS. He had onset of irregular heart beats in 2010, but atrial fib was not diagnosed until later.    He underwent cardioversion twice - January 15, 2013 and January 30, 2013.   He had an ablation of atrial fibrillation with cryo balloon in March of 2012.   With a hospitalization 12/2017 at [facility] with idiopathic transverse myelitis. He follows up with Dr. Helms for this. Other than complains of weakness and feeling of burning/tingling sensation in hands and feet he feels otherwise well.     Interval History:  Martin presents to the office in follow up. He has a few questions today about stopping medications. We discussed that he needs to continue anticoagulation to reduce his stroke risk.        Assessment and plan:   Watchman Discussion   -Patient has a [id number] Score of 2 (CMP, Age)   -HAS-BLED Score: 2:  Risk was 4.1% in one validation study and 1.88 bleeds per 100 patient-years in another validation study.   -At this time he is doing well on AC, no bleeding, excessive bruising or labile INR's     If he chooses we can switch him to a DOAC.  Information about all the different DOAC's including generic options were given to him.    ILR   -Battery is depleted, we discussed ILR removal and replacement    -We will not proceed at this time     PAF   -ECG today shows SR   -ILR implanted 3/1/2019, the episodes do not appear to be real. Had one tachy episode lasting 5 seconds on 5/12/2021   -No recurrent episodes of atrial fibrillation documented since his ablation    -s/p afib ablation. 03/2012   -Patient has a [id number] Score of 2 (CMP, Age) remains on coumadin Denies signs and symptoms of bleeding or excessive bruising, will continue to monitor for this.    -Will monitor his HR and

## 2024-01-04 ENCOUNTER — OFFICE VISIT (OUTPATIENT)
Dept: CARDIOLOGY CLINIC | Age: 74
End: 2024-01-04

## 2024-01-04 VITALS
BODY MASS INDEX: 29.01 KG/M2 | HEIGHT: 72 IN | WEIGHT: 214.2 LBS | OXYGEN SATURATION: 96 % | SYSTOLIC BLOOD PRESSURE: 120 MMHG | DIASTOLIC BLOOD PRESSURE: 80 MMHG | HEART RATE: 59 BPM

## 2024-01-04 DIAGNOSIS — I48.0 PAF (PAROXYSMAL ATRIAL FIBRILLATION) (HCC): Primary | ICD-10-CM

## 2024-01-04 DIAGNOSIS — I47.29 NSVT (NONSUSTAINED VENTRICULAR TACHYCARDIA) (HCC): ICD-10-CM

## 2024-01-04 DIAGNOSIS — I42.8 NICM (NONISCHEMIC CARDIOMYOPATHY) (HCC): ICD-10-CM

## 2024-01-04 NOTE — PATIENT INSTRUCTIONS
of several pharmaceutical companies participating. This program serves as another option for patients who pay full price at the pharmacy, including those who don't have insurance, or who are in the deductible phase of a high-deductible commercial insurance plan. Through this program, many [employer] products can be accessed at a discounted price, providing savings that are realized immediately at the pharmacy. For a list of participating pharmacies, medications, and other important information about the program, visit www.EmergenSee."[employer].  Medicine Assistance Tool (MAT)  [place] [employer] Assistance Tool (MAT)  is a search engine designed to help patients, caregivers and health care providers learn more about the resources available through the various biopharmaceutical industry programs. [employer] is not its own patient assistance program but rather a search engine for many of the patient assistance resources that the biopharmaceutical industry offers. For more information, visit www.Affymax.org.  Medicare  This official Medicare website can help patients, caregivers, and doctors find information about public and private prescription drug assistance programs in their area, including eligibility requirements and covered services. For more information, visit www.medicare.gov.  My Medicare Matters  The My Medicare Matters website is designed to help individuals learn more about Medicare prescription drug coverage. The website was developed using materials from the Centers for Medicare and Medicaid Services, as well as materials developed by the National [place] on Aging (NCOA), the Access to Benefits Coalition (ABC), and [employer]. For more information, visit www.VL[employer] Partnerscarematters.org.  NeedyMeds  NeedyMeds is devoted to helping people in need find assistance programs to help them afford their medications and costs related to health care. The NeedyMeds website provides information on company patient assistance programs, free and

## 2024-01-09 RX ORDER — WARFARIN SODIUM 5 MG/1
TABLET ORAL
Qty: 96 TABLET | Refills: 1 | Status: SHIPPED | OUTPATIENT
Start: 2024-01-09

## 2024-01-09 RX ORDER — LISINOPRIL 5 MG/1
5 TABLET ORAL DAILY
Qty: 90 TABLET | Refills: 1 | Status: SHIPPED | OUTPATIENT
Start: 2024-01-09

## 2024-01-09 RX ORDER — LISINOPRIL 5 MG/1
5 TABLET ORAL DAILY
Qty: 90 TABLET | Refills: 1 | Status: SHIPPED | OUTPATIENT
Start: 2024-01-09 | End: 2024-01-09 | Stop reason: SDUPTHER

## 2024-02-21 ENCOUNTER — TELEPHONE (OUTPATIENT)
Dept: CARDIOLOGY CLINIC | Age: 74
End: 2024-02-21

## 2024-02-21 NOTE — TELEPHONE ENCOUNTER
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 Denied SOB, lightheaded or elevated HR. Please call to advise.

## 2024-02-21 NOTE — TELEPHONE ENCOUNTER
I spoke with pt. Did not notice any lightheadedness.  Denies CP, radiating pain. He denes CP, radiating pan. He went to the school nurse she check vitals. /98 HR was 66-67.She checked for stroke via FACE method. He states that he feels fine now. Nurse was unable to check blood sugar. Checked his BP while on the phone  , 170/96. Pt wants to know if he is ok to drive home.

## 2024-02-21 NOTE — TELEPHONE ENCOUNTER
Sudden onset of blurry vision, words were unfocused when he was trying to red. Advised ED evaluation in case stroke symptoms. His wife will be picking him up from school

## 2024-02-22 NOTE — TELEPHONE ENCOUNTER
He should follow up with PCP. ED visit not cardiac related. He should follow up with MXA in one year as discussed at OV 1/2024

## 2024-02-22 NOTE — TELEPHONE ENCOUNTER
Pt states he went to  ER yesterday as advised. Pt states he was told to fu with MXA. Please review and advise.

## 2024-03-15 LAB
INR BLD: 2.1 (ref 0.9–1.1)
PROTHROMBIN TIME: 24.3 SECONDS (ref 12.1–15.1)

## 2024-04-10 ENCOUNTER — PATIENT MESSAGE (OUTPATIENT)
Dept: CARDIOLOGY CLINIC | Age: 74
End: 2024-04-10

## 2024-04-10 NOTE — TELEPHONE ENCOUNTER
From: Martin Israel  To: Dr. Orlando Gtz  Sent: 4/10/2024 9:54 AM EDT  Subject: Eliquis    I have decided to contact my pharmacy and make the switch to Eliquis. In order to get an accurate quote, I need to know the dosage that Dr. Gtz would prescribe for me.    Thanks.  (Martin) John Israel  1950

## 2024-04-19 NOTE — TELEPHONE ENCOUNTER
Pt asking if 5 mg of eliquis would be once daily or twice daily. Pt trying to get an estimate on cost. Please call today to advise

## 2024-08-01 ENCOUNTER — TELEPHONE (OUTPATIENT)
Dept: CARDIOLOGY CLINIC | Age: 74
End: 2024-08-01

## 2024-08-01 DIAGNOSIS — I48.0 PAF (PAROXYSMAL ATRIAL FIBRILLATION) (HCC): Primary | ICD-10-CM

## 2024-08-01 NOTE — TELEPHONE ENCOUNTER
Deedee from [place] coumadin [facility] requesting a standing PTI order for pt   Fax [phone]

## 2024-08-21 RX ORDER — WARFARIN SODIUM 5 MG/1
TABLET ORAL
Qty: 96 TABLET | Refills: 3 | Status: SHIPPED | OUTPATIENT
Start: 2024-08-21

## 2024-08-21 NOTE — TELEPHONE ENCOUNTER
Received refill request for  warfarin (COUMADIN) 5 MG  from  [facility].     Last OV: 1/4/24    Next OV: 10/17/24    Last Labs:     Last Filled: 1/9/24

## 2024-10-07 ENCOUNTER — TELEPHONE (OUTPATIENT)
Dept: CARDIOLOGY CLINIC | Age: 74
End: 2024-10-07

## 2024-10-22 NOTE — PROGRESS NOTES
98.9 kg (218 lb)   01/04/24 97.2 kg (214 lb 3.2 oz)   11/17/22 96.2 kg (212 lb 1.3 oz)     Constitutional: Oriented. No distress.   Head: Normocephalic and atraumatic.   Mouth/Throat: Oropharynx is clear and moist.   Eyes: Conjunctivae normal. EOM are normal.   Neck: Neck supple. No rigidity. No JVD present.    Cardiovascular: Normal rate, regular rhythm, S1&S2.    Pulmonary/Chest: Bilateral respiratory sounds. No wheezes, No rhonchi.    Abdominal: Soft. Bowel sounds present. No distension, No tenderness.   Musculoskeletal: No tenderness. No edema    Lymphadenopathy: Has no cervical adenopathy.   Neurological: Alert and oriented. Cranial nerve appears intact, No Gross deficit   Skin: Skin is warm and dry. No rash noted.   Psychiatric: Has a normal behavior       Review of System:  [x] Full ROS obtained and negative except as mentioned in HPI    Prior to Admission medications    Medication Sig Start Date End Date Taking? Authorizing Provider   apixaban (ELIQUIS) 5 MG TABS tablet Take 1 tablet by mouth 2 times daily 10/30/24  Yes Orlando Gtz MD   lisinopril (PRINIVIL;ZESTRIL) 5 MG tablet Take 1 tablet by mouth daily 1/9/24  Yes Martin Dupont APRN - CNP   ergocalciferol (ERGOCALCIFEROL) 69213 units capsule Take 1 capsule by mouth once a week 9/5/17  Yes Trisha Scott MD   oxybutynin (DITROPAN) 5 MG/5ML syrup Take 10 mLs by mouth at bedtime   Yes Trisha Scott MD   tamsulosin (FLOMAX) 0.4 MG capsule Take 1 capsule by mouth daily   Yes ProviderTrisha MD   Multiple Vitamins-Minerals (THERAPEUTIC MULTIVITAMIN-MINERALS) tablet Take 2 tablets by mouth daily   Yes ProviderTrisha MD       No Known Allergies    Social History:  Reviewed.  reports that he has never smoked. He has never used smokeless tobacco. He reports current alcohol use. He reports that he does not use drugs.     Family History:  Reviewed. Reviewed. No family history of SCD.      Relevant and available labs, and

## 2024-10-30 ENCOUNTER — OFFICE VISIT (OUTPATIENT)
Dept: CARDIOLOGY CLINIC | Age: 74
End: 2024-10-30

## 2024-10-30 VITALS
HEART RATE: 74 BPM | HEIGHT: 72 IN | SYSTOLIC BLOOD PRESSURE: 130 MMHG | DIASTOLIC BLOOD PRESSURE: 84 MMHG | OXYGEN SATURATION: 97 % | BODY MASS INDEX: 29.53 KG/M2 | WEIGHT: 218 LBS

## 2024-10-30 DIAGNOSIS — I48.0 PAF (PAROXYSMAL ATRIAL FIBRILLATION) (HCC): Primary | ICD-10-CM

## 2024-10-30 DIAGNOSIS — I42.8 NICM (NONISCHEMIC CARDIOMYOPATHY) (HCC): ICD-10-CM

## 2024-10-30 DIAGNOSIS — I47.29 NSVT (NONSUSTAINED VENTRICULAR TACHYCARDIA) (HCC): ICD-10-CM

## 2024-10-30 NOTE — PATIENT INSTRUCTIONS
Once you receive Eliquis, stop coumadin for 2-3 days then start Eliquis         So, you are starting Eliquis (Apixaban)?   Please see below with helpful tips on lessening the cost:  ALL Commercial Insurance pts should get the $10 copay card.  IF we do not have them - you can go to Eliquis.com and activate the card from the website.  These must be activated before presenting this at the pharmacy.  This step is crucial!!!  Otherwise, the pharmacy will state the card is invalid.    The card is good for 24 months upon activation - once 24 months is used, you will get another card (or go to website) and active another card.  Medicare patients with Part D -   Will pay their standard copay amount (typically $10-$60 month/max)  When hit [facility] - Will pay 25% of the drug cost (will be $140/month based on $561 wholesale retail cost of the drug  If can't afford the $140 - call 9-752Aldagen to ask about resources available (Charlette said it helps to say resources available and not what assistance is available)  There is a program called Medicare Extra help - can be used once they hit the doughnut hole Medicare patients with no prescription coverage (or self-pay patients)   You will need to call the 2-744-Eliquis to ask what resources are available for them   If necessary- they should be applying for Medicaid and then the drug is free for them (typically 23K individual yearly income)    9-933-Eliquis (1-695.692.8474)  Always remember-shop around as pharmacies can differ quite a lot on cost.    Singlecare.com  GoodRX.com    Additional Affordability Resources    Other Resources   Here are some additional resources that may help you gain access to the medicines or services you need. This is not a complete list and is provided as a public service for health care providers, caregivers, and low-income patients.     Area Agencies on Aging (ElderCare)  Local area agencies on aging may be able to help patients age 65 years and older

## 2025-02-13 ENCOUNTER — TELEPHONE (OUTPATIENT)
Dept: CARDIOLOGY CLINIC | Age: 75
End: 2025-02-13

## 2025-02-13 NOTE — TELEPHONE ENCOUNTER
CARDIAC CLEARANCE     What type of procedure are you having?  Colonoscopy   Which physician is performing your procedure?  Dr alva  When is your procedure scheduled for?  3/24  Where are you having this procedure?  [place] [facility]   Are you taking Blood Thinners?   If so what? (Name/dose/frequesncy)  Elequis 5mg    Does the surgeon want you to stop your blood thinner?  If so for how long?  2 days  Phone Number and Contact Name for Physicians office:  [facility] [phone]  Fax number to send information:  [phone]

## 2025-02-14 NOTE — TELEPHONE ENCOUNTER
Spoke with patient and advised him of the message from STAN Cardenas RN.  Verbalized understanding.

## 2025-02-25 ENCOUNTER — PATIENT MESSAGE (OUTPATIENT)
Dept: CARDIOLOGY CLINIC | Age: 75
End: 2025-02-25